# Patient Record
Sex: FEMALE | URBAN - METROPOLITAN AREA
[De-identification: names, ages, dates, MRNs, and addresses within clinical notes are randomized per-mention and may not be internally consistent; named-entity substitution may affect disease eponyms.]

---

## 2018-03-01 ENCOUNTER — OUTPATIENT (OUTPATIENT)
Dept: OUTPATIENT SERVICES | Facility: HOSPITAL | Age: 73
LOS: 1 days | End: 2018-03-01

## 2018-03-01 DIAGNOSIS — Z98.89 OTHER SPECIFIED POSTPROCEDURAL STATES: Chronic | ICD-10-CM

## 2018-03-23 DIAGNOSIS — R69 ILLNESS, UNSPECIFIED: ICD-10-CM

## 2018-10-01 ENCOUNTER — OUTPATIENT (OUTPATIENT)
Dept: OUTPATIENT SERVICES | Facility: HOSPITAL | Age: 73
LOS: 1 days | End: 2018-10-01
Payer: MEDICAID

## 2018-10-01 DIAGNOSIS — Z98.89 OTHER SPECIFIED POSTPROCEDURAL STATES: Chronic | ICD-10-CM

## 2018-10-01 PROCEDURE — G9001: CPT

## 2018-10-11 DIAGNOSIS — Z71.89 OTHER SPECIFIED COUNSELING: ICD-10-CM

## 2019-05-15 PROBLEM — Z00.00 ENCOUNTER FOR PREVENTIVE HEALTH EXAMINATION: Status: ACTIVE | Noted: 2019-05-15

## 2019-06-03 ENCOUNTER — APPOINTMENT (OUTPATIENT)
Dept: ELECTROPHYSIOLOGY | Facility: CLINIC | Age: 74
End: 2019-06-03
Payer: MEDICAID

## 2019-06-03 ENCOUNTER — NON-APPOINTMENT (OUTPATIENT)
Age: 74
End: 2019-06-03

## 2019-06-03 VITALS
SYSTOLIC BLOOD PRESSURE: 125 MMHG | RESPIRATION RATE: 15 BRPM | DIASTOLIC BLOOD PRESSURE: 84 MMHG | HEIGHT: 58 IN | TEMPERATURE: 97.7 F | WEIGHT: 136 LBS | HEART RATE: 66 BPM | OXYGEN SATURATION: 98 % | BODY MASS INDEX: 28.55 KG/M2

## 2019-06-03 DIAGNOSIS — Z86.39 PERSONAL HISTORY OF OTHER ENDOCRINE, NUTRITIONAL AND METABOLIC DISEASE: ICD-10-CM

## 2019-06-03 DIAGNOSIS — Z78.9 OTHER SPECIFIED HEALTH STATUS: ICD-10-CM

## 2019-06-03 DIAGNOSIS — I44.7 LEFT BUNDLE-BRANCH BLOCK, UNSPECIFIED: ICD-10-CM

## 2019-06-03 PROCEDURE — 99203 OFFICE O/P NEW LOW 30 MIN: CPT

## 2019-06-03 PROCEDURE — 93000 ELECTROCARDIOGRAM COMPLETE: CPT

## 2019-06-03 NOTE — HISTORY OF PRESENT ILLNESS
[FreeTextEntry1] : Darrin Chicas MD\par \par Sara Israel is a 74y/o woman with Hx of HTN, HLD, DMII, all of which are stable, hypothyroid, and recurring dizziness and known LBBB who presents today for initial evaluation. Was recently staying in [place] with her granddaughter and was having multiple episodes of dizziness. At the time, there was medication confusion and she may have been taking too much antihypertensives. Since that time, she has now been better managing her medications and has been without dizziness. Denies chest pain, palpitations, SOB, syncope or near syncope.

## 2019-06-03 NOTE — DISCUSSION/SUMMARY
[FreeTextEntry1] : Sara Israel is a 72y/o woman with Hx of HTN, HLD, DMII, all of which are stable, hypothyroid, and recurring dizziness and known LBBB who presents today for initial evaluation.\par \par Impression:\par \par 1. LBBB: EKG today NSR with LBBB. No history of syncope. Recurring dizziness likely secondary to hypotension given medication confusion at that time. Consider possible CardioNet to assess for presence of bradyarrhythmias associated with dizziness. \par \par 2. HTN: resume oral antihypertensives as prescribed. Encouraged heart healthy diet, sodium restriction, and weight loss. Continue regular f/u with Cardiologist for further HTN management.\par \par 3. HLD: resume statin therapy as prescribed and regular f/u with Cardiologist for routine lipid monitoring and management.\par \par Continue regular f/u with Cardiologist and RTO as needed or if any new symptoms or syncope occur.

## 2019-06-03 NOTE — PHYSICAL EXAM
[General Appearance - Well Developed] : well developed [Normal Appearance] : normal appearance [General Appearance - Well Nourished] : well nourished [Well Groomed] : well groomed [General Appearance - In No Acute Distress] : no acute distress [No Deformities] : no deformities [Eyelids - No Xanthelasma] : the eyelids demonstrated no xanthelasmas [Normal Conjunctiva] : the conjunctiva exhibited no abnormalities [No Oral Pallor] : no oral pallor [Normal Oral Mucosa] : normal oral mucosa [Normal Jugular Venous A Waves Present] : normal jugular venous A waves present [No Oral Cyanosis] : no oral cyanosis [Normal Jugular Venous V Waves Present] : normal jugular venous V waves present [No Jugular Venous Raines A Waves] : no jugular venous raines A waves [Heart Sounds] : normal S1 and S2 [Murmurs] : no murmurs present [Heart Rate And Rhythm] : heart rate and rhythm were normal [Exaggerated Use Of Accessory Muscles For Inspiration] : no accessory muscle use [Respiration, Rhythm And Depth] : normal respiratory rhythm and effort [Auscultation Breath Sounds / Voice Sounds] : lungs were clear to auscultation bilaterally [Abdomen Soft] : soft [Abdomen Tenderness] : non-tender [Abdomen Mass (___ Cm)] : no abdominal mass palpated [Abnormal Walk] : normal gait [Gait - Sufficient For Exercise Testing] : the gait was sufficient for exercise testing [Petechial Hemorrhages (___cm)] : no petechial hemorrhages [Nail Clubbing] : no clubbing of the fingernails [Cyanosis, Localized] : no localized cyanosis [Skin Color & Pigmentation] : normal skin color and pigmentation [No Venous Stasis] : no venous stasis [] : no rash [Skin Lesions] : no skin lesions [No Xanthoma] : no  xanthoma was observed [No Skin Ulcers] : no skin ulcer [Mood] : the mood was normal [Oriented To Time, Place, And Person] : oriented to person, place, and time [Affect] : the affect was normal [No Anxiety] : not feeling anxious

## 2019-06-12 ENCOUNTER — OUTPATIENT (OUTPATIENT)
Dept: OUTPATIENT SERVICES | Facility: HOSPITAL | Age: 74
LOS: 1 days | End: 2019-06-12
Payer: MEDICAID

## 2019-06-12 ENCOUNTER — APPOINTMENT (OUTPATIENT)
Dept: MRI IMAGING | Facility: IMAGING CENTER | Age: 74
End: 2019-06-12
Payer: MEDICAID

## 2019-06-12 DIAGNOSIS — R51 HEADACHE: ICD-10-CM

## 2019-06-12 DIAGNOSIS — Z98.89 OTHER SPECIFIED POSTPROCEDURAL STATES: Chronic | ICD-10-CM

## 2019-06-12 PROCEDURE — 70551 MRI BRAIN STEM W/O DYE: CPT

## 2019-06-12 PROCEDURE — 70551 MRI BRAIN STEM W/O DYE: CPT | Mod: 26

## 2019-09-03 ENCOUNTER — APPOINTMENT (OUTPATIENT)
Dept: NEUROLOGY | Facility: CLINIC | Age: 74
End: 2019-09-03

## 2021-11-10 ENCOUNTER — FORM ENCOUNTER (OUTPATIENT)
Age: 76
End: 2021-11-10

## 2021-11-18 ENCOUNTER — APPOINTMENT (OUTPATIENT)
Dept: HOME HEALTH SERVICES | Facility: HOME HEALTH | Age: 76
End: 2021-11-18
Payer: MEDICARE

## 2021-11-18 VITALS — SYSTOLIC BLOOD PRESSURE: 174 MMHG | HEART RATE: 74 BPM | DIASTOLIC BLOOD PRESSURE: 74 MMHG | OXYGEN SATURATION: 99 %

## 2021-11-18 DIAGNOSIS — K42.9 UMBILICAL HERNIA W/OUT OBSTRUCTION OR GANGRENE: ICD-10-CM

## 2021-11-18 DIAGNOSIS — R53.1 WEAKNESS: ICD-10-CM

## 2021-11-18 DIAGNOSIS — Z23 ENCOUNTER FOR IMMUNIZATION: ICD-10-CM

## 2021-11-18 PROCEDURE — 99345 HOME/RES VST NEW HIGH MDM 75: CPT | Mod: 25

## 2021-11-18 PROCEDURE — G0506: CPT

## 2021-11-18 PROCEDURE — 99497 ADVNCD CARE PLAN 30 MIN: CPT

## 2021-11-18 RX ORDER — LOSARTAN POTASSIUM 50 MG/1
50 TABLET, FILM COATED ORAL DAILY
Qty: 90 | Refills: 2 | Status: COMPLETED | COMMUNITY
Start: 2019-06-03 | End: 2021-11-18

## 2021-11-18 RX ORDER — METFORMIN ER 750 MG 750 MG/1
750 TABLET ORAL DAILY
Refills: 0 | Status: COMPLETED | COMMUNITY
Start: 2019-06-03 | End: 2021-11-18

## 2021-11-20 PROBLEM — R53.1 GENERALIZED WEAKNESS: Status: ACTIVE | Noted: 2021-11-20

## 2021-11-20 PROBLEM — K42.9 UMBILICAL HERNIA WITHOUT OBSTRUCTION AND WITHOUT GANGRENE: Status: ACTIVE | Noted: 2021-11-18

## 2021-11-20 NOTE — COUNSELING
[Overweight - ( BMI 25.1 - 29.9 )] : overweight -  ( BMI 25.1 - 29.9 ) [Continue diet as tolerated] : continue diet as tolerated based on goals of care [Non - Smoker] : non-smoker [Remove clutter and unsafe carpeting to avoid falls] : remove clutter and unsafe carpeting to avoid falls [] : foot exam [Completed] : Aspirin use discussion completed [Improve mobility] : improve mobility [Decrease hospital use] : decrease hospital use [Minimize unnecessary interventions] : minimize unnecessary interventions [Maintain functional ability] : maintain functional ability [Patient/Caregiver has ___ understanding of disease process] : patient/caregiver has [unfilled] understanding of disease process [Completed Medical Orders for Life-Sustaining Treatment] : completed medical orders for life-sustaining treatment [Full Code] : Code Status: Full Code [No Limitations] : Treatment Guidelines: No limitations [[facility]: [facility] [[facility]filled] [Trial of Intubation] : Intubation: Trial of Intubation [_____] : HCP: [unfilled] [ - New patient with 2 or more chronic conditions; CCM discussed and patient-centered care plan established] : New patient with 2 or more chronic conditions; CCM discussed and patient-centered care plan established

## 2021-11-24 ENCOUNTER — LABORATORY RESULT (OUTPATIENT)
Age: 76
End: 2021-11-24

## 2021-11-26 ENCOUNTER — NON-APPOINTMENT (OUTPATIENT)
Age: 76
End: 2021-11-26

## 2021-11-29 ENCOUNTER — NON-APPOINTMENT (OUTPATIENT)
Age: 76
End: 2021-11-29

## 2021-12-14 NOTE — COUNSELING
[Overweight - ( BMI 25.1 - 29.9 )] : overweight -  ( BMI 25.1 - 29.9 ) [Continue diet as tolerated] : continue diet as tolerated based on goals of care [Non - Smoker] : non-smoker [Remove clutter and unsafe carpeting to avoid falls] : remove clutter and unsafe carpeting to avoid falls [] : foot exam [Completed] : Aspirin use discussion completed [Improve mobility] : improve mobility [Decrease hospital use] : decrease hospital use [Minimize unnecessary interventions] : minimize unnecessary interventions [Maintain functional ability] : maintain functional ability [Patient/Caregiver has ___ understanding of disease process] : patient/caregiver has [unfilled] understanding of disease process [Completed Medical Orders for Life-Sustaining Treatment] : completed medical orders for life-sustaining treatment [Full Code] : Code Status: Full Code [No Limitations] : Treatment Guidelines: No limitations [Trial of Intubation] : Intubation: Trial of Intubation [[facility]: _____[facility]last verification date: [unfilled] [_____] : HCP: [unfilled]

## 2021-12-15 ENCOUNTER — APPOINTMENT (OUTPATIENT)
Dept: HOME HEALTH SERVICES | Facility: HOME HEALTH | Age: 76
End: 2021-12-15
Payer: MEDICARE

## 2021-12-15 VITALS — DIASTOLIC BLOOD PRESSURE: 64 MMHG | SYSTOLIC BLOOD PRESSURE: 130 MMHG | HEART RATE: 74 BPM | OXYGEN SATURATION: 100 %

## 2021-12-15 DIAGNOSIS — M17.11 UNILATERAL PRIMARY OSTEOARTHRITIS, RIGHT KNEE: ICD-10-CM

## 2021-12-15 PROCEDURE — 99349 HOME/RES VST EST MOD MDM 40: CPT

## 2021-12-15 RX ORDER — PRAVASTATIN SODIUM 10 MG/1
10 TABLET ORAL
Qty: 90 | Refills: 0 | Status: COMPLETED | COMMUNITY
Start: 2019-06-03 | End: 2021-12-15

## 2021-12-15 RX ORDER — LEVOTHYROXINE SODIUM 0.03 MG/1
25 TABLET ORAL DAILY
Qty: 30 | Refills: 0 | Status: COMPLETED | COMMUNITY
Start: 2019-06-03 | End: 2021-12-15

## 2022-01-07 PROBLEM — M17.11 PRIMARY OSTEOARTHRITIS OF RIGHT KNEE: Status: ACTIVE | Noted: 2021-11-18

## 2022-01-19 ENCOUNTER — LABORATORY RESULT (OUTPATIENT)
Age: 77
End: 2022-01-19

## 2022-01-19 ENCOUNTER — NON-APPOINTMENT (OUTPATIENT)
Age: 77
End: 2022-01-19

## 2022-01-19 LAB
ALBUMIN SERPL ELPH-MCNC: 4.7 G/DL
ALP BLD-CCNC: 70 U/L
ALT SERPL-CCNC: 11 U/L
ANION GAP SERPL CALC-SCNC: 12 MMOL/L
AST SERPL-CCNC: 18 U/L
BASOPHILS # BLD AUTO: 0.1 K/UL
BASOPHILS NFR BLD AUTO: 1.3 %
BILIRUB SERPL-MCNC: 0.6 MG/DL
BUN SERPL-MCNC: 15 MG/DL
CALCIUM SERPL-MCNC: 9.9 MG/DL
CHLORIDE SERPL-SCNC: 104 MMOL/L
CO2 SERPL-SCNC: 25 MMOL/L
CREAT SERPL-MCNC: 0.83 MG/DL
EOSINOPHIL # BLD AUTO: 0.07 K/UL
EOSINOPHIL NFR BLD AUTO: 0.9 %
GLUCOSE SERPL-MCNC: 122 MG/DL
HCT VFR BLD CALC: 35 %
HGB BLD-MCNC: 11.2 G/DL
IMM GRANULOCYTES NFR BLD AUTO: 0.6 %
LYMPHOCYTES # BLD AUTO: 3.56 K/UL
LYMPHOCYTES NFR BLD AUTO: 46 %
MAN DIFF?: NORMAL
MCHC RBC-ENTMCNC: 30 PG
MCHC RBC-ENTMCNC: 32 GM/DL
MCV RBC AUTO: 93.8 FL
MONOCYTES # BLD AUTO: 0.54 K/UL
MONOCYTES NFR BLD AUTO: 7 %
NEUTROPHILS # BLD AUTO: 3.42 K/UL
NEUTROPHILS NFR BLD AUTO: 44.2 %
PLATELET # BLD AUTO: 283 K/UL
POTASSIUM SERPL-SCNC: 5 MMOL/L
PROT SERPL-MCNC: 6.8 G/DL
RBC # BLD: 3.73 M/UL
RBC # FLD: 13.1 %
SODIUM SERPL-SCNC: 141 MMOL/L
WBC # FLD AUTO: 7.74 K/UL

## 2022-01-24 LAB
24R-OH-CALCIDIOL SERPL-MCNC: 69.4 PG/ML
25(OH)D3 SERPL-MCNC: 56 NG/ML
ESTIMATED AVERAGE GLUCOSE: 148 MG/DL
HBA1C MFR BLD HPLC: 6.8 %
SARS-COV-2 N GENE NPH QL NAA+PROBE: NOT DETECTED

## 2022-03-14 ENCOUNTER — APPOINTMENT (OUTPATIENT)
Dept: HOME HEALTH SERVICES | Facility: HOME HEALTH | Age: 77
End: 2022-03-14
Payer: MEDICARE

## 2022-03-14 VITALS — BODY MASS INDEX: 26.79 KG/M2 | WEIGHT: 127.6 LBS | HEIGHT: 58 IN

## 2022-03-14 VITALS — OXYGEN SATURATION: 99 % | SYSTOLIC BLOOD PRESSURE: 122 MMHG | DIASTOLIC BLOOD PRESSURE: 66 MMHG | HEART RATE: 72 BPM

## 2022-03-14 DIAGNOSIS — E78.00 PURE HYPERCHOLESTEROLEMIA, UNSPECIFIED: ICD-10-CM

## 2022-03-14 PROCEDURE — 99349 HOME/RES VST EST MOD MDM 40: CPT

## 2022-03-14 RX ORDER — TERBINAFINE HYDROCHLORIDE 1 G/100G
1 CREAM TOPICAL 3 TIMES DAILY
Qty: 1 | Refills: 0 | Status: COMPLETED | COMMUNITY
Start: 2022-01-21 | End: 2022-03-14

## 2022-03-14 RX ORDER — GUAIFENESIN AND DEXTROMETHORPHAN 10; 100 MG/5ML; MG/5ML
10-100 SYRUP ORAL
Qty: 1 | Refills: 2 | Status: COMPLETED | COMMUNITY
Start: 2022-01-13 | End: 2022-03-14

## 2022-03-14 NOTE — COUNSELING
[Overweight - ( BMI 25.1 - 29.9 )] : overweight -  ( BMI 25.1 - 29.9 ) [Continue diet as tolerated] : continue diet as tolerated based on goals of care [Non - Smoker] : non-smoker [Remove clutter and unsafe carpeting to avoid falls] : remove clutter and unsafe carpeting to avoid falls [] : foot exam [Completed] : Aspirin use discussion completed [Improve mobility] : improve mobility [Decrease hospital use] : decrease hospital use [Minimize unnecessary interventions] : minimize unnecessary interventions [Maintain functional ability] : maintain functional ability [Patient/Caregiver has ___ understanding of disease process] : patient/caregiver has [unfilled] understanding of disease process [Completed Medical Orders for Life-Sustaining Treatment] : completed medical orders for life-sustaining treatment [Full Code] : Code Status: Full Code [Trial of Intubation] : Intubation: Trial of Intubation [No Limitations] : Treatment Guidelines: No limitations [[facility]: _____[facility]/last verification date: [unfilled] [_____] : HCP: [unfilled]

## 2022-05-23 ENCOUNTER — NON-APPOINTMENT (OUTPATIENT)
Age: 77
End: 2022-05-23

## 2022-05-31 ENCOUNTER — LABORATORY RESULT (OUTPATIENT)
Age: 77
End: 2022-05-31

## 2022-05-31 LAB
ALBUMIN SERPL ELPH-MCNC: 4.8 G/DL
ALP BLD-CCNC: 64 U/L
ALT SERPL-CCNC: 18 U/L
ANION GAP SERPL CALC-SCNC: 17 MMOL/L
AST SERPL-CCNC: 25 U/L
BASOPHILS # BLD AUTO: 0.07 K/UL
BASOPHILS NFR BLD AUTO: 1.1 %
BILIRUB SERPL-MCNC: 0.5 MG/DL
BUN SERPL-MCNC: 24 MG/DL
CALCIUM SERPL-MCNC: 9.8 MG/DL
CHLORIDE SERPL-SCNC: 102 MMOL/L
CHOLEST SERPL-MCNC: 179 MG/DL
CO2 SERPL-SCNC: 18 MMOL/L
CREAT SERPL-MCNC: 0.9 MG/DL
EGFR: 67 ML/MIN/1.73M2
EOSINOPHIL # BLD AUTO: 0.09 K/UL
EOSINOPHIL NFR BLD AUTO: 1.5 %
ESTIMATED AVERAGE GLUCOSE: 146 MG/DL
GLUCOSE SERPL-MCNC: 111 MG/DL
HBA1C MFR BLD HPLC: 6.7 %
HCT VFR BLD CALC: 35.6 %
HDLC SERPL-MCNC: 57 MG/DL
HGB BLD-MCNC: 11.2 G/DL
IMM GRANULOCYTES NFR BLD AUTO: 0.3 %
LDLC SERPL CALC-MCNC: 97 MG/DL
LYMPHOCYTES # BLD AUTO: 2.84 K/UL
LYMPHOCYTES NFR BLD AUTO: 46.6 %
MAN DIFF?: NORMAL
MCHC RBC-ENTMCNC: 30.1 PG
MCHC RBC-ENTMCNC: 31.5 GM/DL
MCV RBC AUTO: 95.7 FL
MONOCYTES # BLD AUTO: 0.42 K/UL
MONOCYTES NFR BLD AUTO: 6.9 %
NEUTROPHILS # BLD AUTO: 2.66 K/UL
NEUTROPHILS NFR BLD AUTO: 43.6 %
NONHDLC SERPL-MCNC: 122 MG/DL
PLATELET # BLD AUTO: 207 K/UL
POTASSIUM SERPL-SCNC: 4.7 MMOL/L
PROT SERPL-MCNC: 6.9 G/DL
RBC # BLD: 3.72 M/UL
RBC # FLD: 12.7 %
SODIUM SERPL-SCNC: 137 MMOL/L
TRIGL SERPL-MCNC: 122 MG/DL
TSH SERPL-ACNC: 0.17 UIU/ML
WBC # FLD AUTO: 6.1 K/UL

## 2022-05-31 RX ORDER — LEVOTHYROXINE SODIUM 0.05 MG/1
50 TABLET ORAL DAILY
Qty: 90 | Refills: 3 | Status: COMPLETED | COMMUNITY
Start: 2021-11-26 | End: 2022-05-31

## 2022-06-09 NOTE — COUNSELING
[Overweight - ( BMI 25.1 - 29.9 )] : overweight -  ( BMI 25.1 - 29.9 ) [Continue diet as tolerated] : continue diet as tolerated based on goals of care [Non - Smoker] : non-smoker [Remove clutter and unsafe carpeting to avoid falls] : remove clutter and unsafe carpeting to avoid falls [] : foot exam [Completed] : Aspirin use discussion completed [Improve mobility] : improve mobility [Decrease hospital use] : decrease hospital use [Minimize unnecessary interventions] : minimize unnecessary interventions [Maintain functional ability] : maintain functional ability [Patient/Caregiver has ___ understanding of disease process] : patient/caregiver has [unfilled] understanding of disease process [Completed Medical Orders for Life-Sustaining Treatment] : completed medical orders for life-sustaining treatment [Full Code] : Code Status: Full Code [No Limitations] : Treatment Guidelines: No limitations [Trial of Intubation] : Intubation: Trial of Intubation [[facility]: _____] [facility]/last verification date: [unfilled] [_____] : HCP: [unfilled]

## 2022-06-10 ENCOUNTER — APPOINTMENT (OUTPATIENT)
Dept: HOME HEALTH SERVICES | Facility: HOME HEALTH | Age: 77
End: 2022-06-10
Payer: MEDICARE

## 2022-06-10 VITALS — OXYGEN SATURATION: 98 % | DIASTOLIC BLOOD PRESSURE: 64 MMHG | HEART RATE: 64 BPM | SYSTOLIC BLOOD PRESSURE: 124 MMHG

## 2022-06-10 VITALS — WEIGHT: 129.6 LBS | BODY MASS INDEX: 27.09 KG/M2

## 2022-06-10 PROCEDURE — 99349 HOME/RES VST EST MOD MDM 40: CPT

## 2022-07-20 ENCOUNTER — APPOINTMENT (OUTPATIENT)
Dept: HOME HEALTH SERVICES | Facility: HOME HEALTH | Age: 77
End: 2022-07-20

## 2022-08-15 ENCOUNTER — NON-APPOINTMENT (OUTPATIENT)
Age: 77
End: 2022-08-15

## 2022-08-15 ENCOUNTER — TRANSCRIPTION ENCOUNTER (OUTPATIENT)
Age: 77
End: 2022-08-15

## 2022-08-17 NOTE — HISTORY OF PRESENT ILLNESS
[Patient] : patient [Family Member] : family member [FreeTextEntry1] : N/A, [facility] [FreeTextEntry2] : COVID SCREEN:\par Patient or caretaker denies fever, cough, trouble breathing, rash, vomiting. Patient has not been in close contact with anyone who is COVID-19 positive, or suspected of having COVID-19.\par \par N95 mask, gloves, eye wear and gown (if indicated) used during visit: Yes.\par \par Total face to face time with patient is 90 min.\par \par HPI:\par 74yo F with PMH early stage dementia, MDD, HTN, HLD, T2DM, hypothyroidism, LBBB, h/o dizziness. Seen today for admission to [facility] program.\par \par Social/Home Environment: Speaks primarily Malayalam, limited English. Strongly Christianity, Mormonism. Father was a  from 2000-year-old tradition based in [place]. As such, she is adamant against certain forms of medical intervention & seeking care. \par -Daughter Siomara is , with Master's in genetics, former schoolteacher. Now a preacher, also believes in blayne healing.\par -Family from [facility]. Has three other daughters, 2 in [place], 1 in [place] (missionary)\par -[employer] insurance\par -neurologist Dr. Luc Judge\par -Dr. Alicia Worley, PCP\par \par Interval Events:\par -Interviewed with daughter, who primarily speaks for pt. Daughter speaks almost continuously and at times is circumstantial.\par -Overall, daughter describes pt's mental health as in decline for past 2 years. Increasing memory deficits & days where she hallucinates. Has seen neurologist, had MR brain done 2019, recently CT brain which again showed no specific findings. MR brain planned.\par -Migraine-like symptoms 2 months ago when she had headaches, paresthesias of right scalp. Daughter was concerned about a stroke, but pt adamantly refused to seek medical attention.\par -Needs assistance with most ADLs at this point. Refuses to shower, stating she's already bathed.\par -Siomara's daughter previously was named HCP, but they lost the paperwork. Patient able to demonstrate understanding of surrogate decision maker and names her daughter Siomara to do so. New form filled today.\par -Endorses weakness, which is ill defined. Will order a set of labs & TTE at home.\par -Metformin 500mg BID -> 750mg ER, but caused low sugar episodes. Held by PCP who noted A1C 6.0% 7/2021\par -Severe knee & back pain. Consider R knee steroid injection in 4 weeks during f/u visit.\par -Refuses COVID vaccine. Per daughter, pt has severe reactions to any vaccines.\par -"Allergic" to flu vaccine, which makes her very sick\par -Left numbers for home visit podiatrist\par -Refuses colon cancer screening due to not tolerating prep. had stool testing 2 years ago last.\par -Refuses mammography after prior discussoin with PCP. Unable to state her reasons. Daughter is unable to/uninterested in getting her to mammography suite\par -Discussed SSRI given daughter's description of her being very depressed recently. Howevet it's unclear she'd be able to introduce a new pill into her routine, as having her take pravastatin required "a long time and a lot of effort."\par -Stopped ASA 81mg after numerous falls including down stairs.\par -Had bad reaction to nuclear agent during stress test previously. Advise she should discuss options with cardiologist, Siomara responds "they never have time to discuss these things." Advise she call this doctor's office and if needed leave a message explaining her mom had a reaction to nuclear stress test and she's looking for another option.\par \par Subjective:\par 1. Appetite/Weight: Appetite fair.\par 2. Gait/Falls: Unsteady, walks grabbing furniture\par 3. Sleep: Naps during day, early in morning.\par 4. BMs: No concerns, able to use toilet but has to be queued.\par 5. Urine: No concerns, able to use toilet but has to be queued.\par 6. Skin: No rash or ulcers.\par 7. Mood/Memory: Memory impaired, mood good.\par \par DME: none\par \par ADVANCE CARE PLANNING:\par 1. Total Time Spent: 20 min\par 2. Participants: Myself, daughter, SW\par 3. Discussion: Goals of Care, Advanced Directives, Health Care Agency, and Disease trajectory\par 4. Disease Trajectory: Discussed and reviewed that patient's health is currently stable, and the prognosis moving forward is unclear.\par 5. Prognosis, Based On Clinician Best Judgment: Indeterminate\par 6. Goals of Care: 1) Extend life, by hospitalization and aggressive measures if needed, 2) Avoid discomfort, 3) Manage medical problems at home where safely possible.\par 7. HCA: Daughter Siomara\par 8. MOLST Completed: CPR yes, intubation and trial ventilation, do hospitalize, no limitation on medical interventions, long-term feeding tube, trial IVF, use antibiotics.\par 9. Hospice Benefit discussion deferred at this time.

## 2022-08-17 NOTE — HISTORY OF PRESENT ILLNESS
[Patient] : patient [Family Member] : family member [FreeTextEntry1] : N/A, [facility] [FreeTextEntry2] : COVID SCREEN:\par Patient or caretaker denies fever, cough, trouble breathing, rash, vomiting. Patient has not been in close contact with anyone who is COVID-19 positive, or suspected of having COVID-19.\par \par N95 mask, gloves, eye wear and gown (if indicated) used during visit: Yes.\par \par Total face to face time with patient is 45 min.\par \par HPI:\par 74yo F with PMH early stage dementia, MDD, HTN, HLD, T2DM, hypothyroidism, LBBB, h/o dizziness. Seen today for 4-week followup visit post admission to [facility].\par \par Social/Home Environment: Speaks primarily Malayalam, limited English. Strongly Scientologist, Muslim. Father was a  from 2000-year-old tradition based in [place]. As such, she is adamant against certain forms of medical intervention & seeking care. \par -Daughter Siomara is , with Master's in genetics, former schoolteacher. Now a preacher, also believes in blayne healing.\par -Family from [facility]. Has three other daughters, 2 in [place], 1 in [place] (missionary)\par -[employer] insurance\par -neurologist Dr. Luc Judge\par -Dr. Alicia Worley, PCP\par \par Interval Events:\par -Interviewed with daughter, who primarily speaks for pt. Daughter speaks almost continuously and at times is circumstantial.\par -Daughter requests longer notice before X-rays\par -Increased levothyroxine to 50mcg daily based on high TSH\par -Labs otherwise unremarkable.\par -Dtr feels her mood/energy have improved since dose change\par -Had TTE with cardiologist showing pericardial effusion, but then normal on re-read\par -Dtr attributes her 's MI to vaccination.\par -Dtr worried about worse AN. However this is chronic and she's had fairly recent TTE\par -Wants lab results (namely cholesterol) sent to cardiologist -- will get repeat TSH and lipid panel in 6 weeks.\par -Vaccine allergy per daughter, so declining any vaccination.\par \par Subjective:\par 1. Appetite/Weight: Appetite fair.\par 2. Gait/Falls: Unsteady, walks grabbing furniture\par 3. Sleep: Naps during day, early in morning.\par 4. BMs: No concerns, able to use toilet but has to be queued.\par 5. Urine: No concerns, able to use toilet but has to be queued.\par 6. Skin: No rash or ulcers.\par 7. Mood/Memory: Memory impaired, mood good.\par \par DME: none

## 2022-08-17 NOTE — HISTORY OF PRESENT ILLNESS
[Patient] : patient [Family Member] : family member [FreeTextEntry1] : N[facility]A, [facility] [FreeTextEntry2] : COVID SCREEN:\par Patient or caretaker denies fever, cough, trouble breathing, rash, vomiting. Patient has not been in close contact with anyone who is COVID-19 positive, or suspected of having COVID-19.\par \par N95 mask, gloves, eye wear and gown (if indicated) used during visit: Yes.\par \par Total face to face time with patient is 45 min.\par \par HPI:\par 75yo F with PMH early stage dementia, MDD, HTN, HLD, T2DM, hypothyroidism, LBBB, h/o dizziness/vertigo. Seen today for routine f/u visit.\par \par Social/Home Environment: Speaks primarily Malayalam, limited English. Strongly Sabianist, Judaism. Father was a  from 2000-year-old tradition based in [place]. As such, she is adamant against certain forms of medical intervention & seeking care. \par -Daughter Siomara is , with master's in genetics, former schoolteacher. Now a preacher, also believes in blayne healing.\par -Family from [facility]. Pt has three other daughters, 2 in [facility], 1 in [place], [place] (missionary)\par -[employer] insurance\par -Neurologist Dr. Luc Judge\par -PCP Dr. Alicia Worley\par \par Interval Events:\par -Interviewed with daughter, who primarily speaks for pt.\par -New severe pain in abdomen, but probably same as cramps previously described\par -Dtr reports ongoing difficulty with getting pt to bathe -- no shower in 4 weeks, and states pt "lies" about how often she showers. Writer does not note pt to be malodorous or very disheveled.\par -Found new podiatrist through [employer]\par -Dtr reports improved memory since starting Align probiotic\par -Pt wants to travel to [place], but dtr frustrated because she cannot tolerate leaving house or wearing mask in public, but she constantly asks to travel without being able to understand the reasons why she can't.\par -Suspect abdominal pain are stomach cramps which have been previously discussed. Pt has known h/o hernia repair with mesh, then removal of half of mesh due to abscess. Dtr suspects her stomach cramps are related to this retained mesh, but pt refused ultrasound at home to assess for presence of intraabdominal pathology. For now, seems intermittent & stable for more than a year at least.\par -Weight has decreased but only comparison is weight from 6/2019 of 136 lbs (127.6 today). Dtr states that on the whole, her appetite is good.\par -Asks about cholesterol, review results which show , apparently decrease from 310mg/dl with previous PCP.\par -Skin lesion of central chest, dtr attributes this to Holter monitor she wore 11/2021. Review that pt ideally should see dermatologist about chest lesion, but pt has been notoriously difficult to bring to doctors. Dtr states lesion is decreasing in size, so we will continue monitoring for now.\par \par Subjective:\par 1. Appetite/Weight: Appetite fair.\par 2. Gait/Falls: Unsteady, walks grabbing furniture\par 3. Sleep: Naps during day, early in morning.\par 4. BMs: No concerns, able to use toilet but has to be queued.\par 5. Urine: No concerns, able to use toilet but has to be queued.\par 6. Skin: No rash or ulcers.\par 7. Mood/Memory: Memory impaired, mood good.\par \par DME: none

## 2022-08-17 NOTE — PHYSICAL EXAM
[No Acute Distress] : no acute distress [Well Nourished] : well nourished [Well Developed] : well developed [Normal Sclera/Conjunctiva] : normal sclera/conjunctiva [EOMI] : extra ocular movement intact [Normal Outer Ear/Nose] : the ears and nose were normal in appearance [Normal Oropharynx] : the oropharynx was normal [No Respiratory Distress] : no respiratory distress [No Accessory Muscle Use] : no accessory muscle use [No Edema] : there was no peripheral edema [Non Tender] : non-tender [Soft] : abdomen soft [Not Distended] : not distended [Normal Post Cervical Nodes] : no posterior cervical lymphadenopathy [Normal Anterior Cervical Nodes] : no anterior cervical lymphadenopathy [Normal Gait] : normal gait [Normal Strength/Tone] : muscle strength and tone were normal [No Rash] : no rash [Normal Affect] : the affect was normal [Normal Mood] : the mood was normal [Normal Voice/Communication] : normal voice communication [No JVD] : no jugular venous distention [Supple] : the neck was supple [No LAD] : no lymphadenopathy [No LE Varicosities] : there were no varicosital changes in the lower extremities [Pedal Pulses Present] : the pedal pulses are present [No Joint Swelling] : no joint swelling seen [No Clubbing, Cyanosis] : no clubbing  or cyanosis of the fingernails [Cranial Nerves Intact] : cranial nerves 2-12 were intact [No Motor Deficits] : the motor exam was normal [No Gross Sensory Deficits] : no gross sensory deficits [Oriented x3] : oriented to person, place, and time [de-identified] : Seated in chair, comfortable, good eye contact. [de-identified] : R eye ptosis [de-identified] : 1.5x1.5cm dark brown/purple papule overlying 5th rib just R of sternum. [de-identified] : Reactive affect. Oriented to place, person, daughter, not date. Thought content hyperreligious. [de-identified] : Long toenails with onychomycosis of all nails.

## 2022-08-17 NOTE — PHYSICAL EXAM
[No Acute Distress] : no acute distress [Well Nourished] : well nourished [Well Developed] : well developed [Normal Sclera/Conjunctiva] : normal sclera/conjunctiva [EOMI] : extra ocular movement intact [Normal Outer Ear/Nose] : the ears and nose were normal in appearance [Normal Oropharynx] : the oropharynx was normal [No Respiratory Distress] : no respiratory distress [Clear to Auscultation] : lungs were clear to auscultation bilaterally [No Accessory Muscle Use] : no accessory muscle use [Normal Rate] : heart rate was normal  [Regular Rhythm] : with a regular rhythm [Normal S1, S2] : normal S1 and S2 [No Murmurs] : no murmurs heard [No Edema] : there was no peripheral edema [Normal Bowel Sounds] : normal bowel sounds [Non Tender] : non-tender [Soft] : abdomen soft [Not Distended] : not distended [Normal Post Cervical Nodes] : no posterior cervical lymphadenopathy [Normal Anterior Cervical Nodes] : no anterior cervical lymphadenopathy [No CVA Tenderness] : no ~M costovertebral angle tenderness [No Spinal Tenderness] : no spinal tenderness [Normal Gait] : normal gait [Normal Strength/Tone] : muscle strength and tone were normal [No Rash] : no rash [Oriented x3] : oriented to person, place, and time [Normal Affect] : the affect was normal [Normal Mood] : the mood was normal [Normal Insight/Judgement] : insight and judgment were intact [de-identified] : R eye ptosis [de-identified] : Reactive affect. Oriented to place, person, daughter, not date. Hyperreligious. [de-identified] : Long toenails with onychomycosis of all nails.

## 2022-08-17 NOTE — REASON FOR VISIT
[Pre-Visit Preparation] : pre-visit preparation was done [Follow-Up] : a follow-up visit [FreeTextEntry2] : chart review

## 2022-08-17 NOTE — REASON FOR VISIT
[Acute] : an acute visit [Pre-Visit Preparation] : pre-visit preparation was done [FreeTextEntry2] : chart review

## 2022-08-17 NOTE — HEALTH RISK ASSESSMENT
[HRA Reviewed] : Health risk assessment reviewed [No falls in past year] : Patient reported no falls in the past year [No] : The patient does not have visual impairment [Some assistance needed] : feeding [Full assistance needed] : managing finances [TimeGetUpGo] : 8

## 2022-08-17 NOTE — HISTORY OF PRESENT ILLNESS
[Patient] : patient [Family Member] : family member [FreeTextEntry1] : N/A, [facility] [FreeTextEntry2] : COVID SCREEN:\par Patient or caretaker denies fever, cough, trouble breathing, rash, vomiting. Patient has not been in close contact with anyone who is COVID-19 positive, or suspected of having COVID-19.\par \par N95 mask, gloves, eye wear and gown (if indicated) used during visit: Yes.\par \par Total face to face time with patient is 45 min.\par \par HPI:\par 75yo F with PMH early stage dementia, MDD, HTN, HLD, T2DM, hypothyroidism, LBBB, h/o dizziness/vertigo. Seen today for routine f/u visit.\par \par Social/Home Environment: Speaks primarily Malayalam, limited English. Strongly Zoroastrian, Pentecostalism. Father was a  from 2000-year-old tradition based in [place]. As such, she is adamant against certain forms of medical intervention & seeking care. \par -Daughter Siomara is , with master's in genetics, former schoolteacher. Now a preacher, also believes in blayne healing.\par -Family from [facility]. Pt has three other daughters, 2 in [facility], 1 in [place], [place] (missionary)\par -[employer] insurance\par -Neurologist Dr. Luc Judge\par -PCP Dr. Alicia Worley\par \par Interval Events:\par -Interviewed with daughter, who primarily speaks for pt.\par -Confusion, daughter reports she is increasingly argumentative, and confuses facts/recent memories and they get in arguments about this\par -[place] edwards suffered breakin by chance recently.\par -CDPAS form filled & faxed today. Dtr asks for writer to add "seal" to paper copy, but this has never been required in past.\par -Dtr complains at length about difficult getting lab to come for fasting draw.\par -Wants to review cardiology reports, advise she download from portal & email to writer. Holter monitor.\par -Burning of stomach wall associated with large ventral hernia. Waist circumference 35.5in, ordering hernia belt due to discomfort associated with ventral hernia\par \par Subjective:\par 1. Appetite/Weight: Appetite fair.\par 2. Gait/Falls: Unsteady, walks grabbing furniture\par 3. Sleep: Naps during day, early in morning.\par 4. BMs: No concerns, able to use toilet but has to be queued.\par 5. Urine: No concerns, able to use toilet but has to be queued.\par 6. Skin: No rash or ulcers.\par 7. Mood/Memory: Memory impaired, mood good.\par \par DME: none

## 2022-08-17 NOTE — REASON FOR VISIT
[Pre-Visit Preparation] : pre-visit preparation was done [Acute] : an acute visit [FreeTextEntry2] : chart review

## 2022-08-17 NOTE — PHYSICAL EXAM
[No Acute Distress] : no acute distress [Well Nourished] : well nourished [Well Developed] : well developed [Normal Voice/Communication] : normal voice communication [Normal Sclera/Conjunctiva] : normal sclera/conjunctiva [EOMI] : extra ocular movement intact [Normal Outer Ear/Nose] : the ears and nose were normal in appearance [Normal Oropharynx] : the oropharynx was normal [No JVD] : no jugular venous distention [Supple] : the neck was supple [No LAD] : no lymphadenopathy [No Respiratory Distress] : no respiratory distress [No Accessory Muscle Use] : no accessory muscle use [No LE Varicosities] : there were no varicosital changes in the lower extremities [Pedal Pulses Present] : the pedal pulses are present [No Edema] : there was no peripheral edema [Non Tender] : non-tender [Soft] : abdomen soft [Not Distended] : not distended [Normal Post Cervical Nodes] : no posterior cervical lymphadenopathy [Normal Anterior Cervical Nodes] : no anterior cervical lymphadenopathy [Normal Gait] : normal gait [No Joint Swelling] : no joint swelling seen [No Clubbing, Cyanosis] : no clubbing  or cyanosis of the fingernails [Normal Strength/Tone] : muscle strength and tone were normal [No Rash] : no rash [Cranial Nerves Intact] : cranial nerves 2-12 were intact [No Motor Deficits] : the motor exam was normal [No Gross Sensory Deficits] : no gross sensory deficits [Oriented x3] : oriented to person, place, and time [Normal Affect] : the affect was normal [Normal Mood] : the mood was normal [de-identified] : Seated in chair, comfortable, good eye contact. [de-identified] : R eye ptosis [de-identified] : Large ventral hernia R of midline adjacent to umilicus. [de-identified] : 1.5x1.5cm dark brown/purple papule overlying 5th rib just R of sternum. [de-identified] : Reactive affect. Oriented to place, person, daughter, not date. Thought content hyperreligious. [de-identified] : Long toenails with onychomycosis of all nails.

## 2022-08-17 NOTE — PHYSICAL EXAM
[No Acute Distress] : no acute distress [Well Nourished] : well nourished [Well Developed] : well developed [Normal Sclera/Conjunctiva] : normal sclera/conjunctiva [EOMI] : extra ocular movement intact [Normal Outer Ear/Nose] : the ears and nose were normal in appearance [Normal Oropharynx] : the oropharynx was normal [No Respiratory Distress] : no respiratory distress [Clear to Auscultation] : lungs were clear to auscultation bilaterally [No Accessory Muscle Use] : no accessory muscle use [Normal Rate] : heart rate was normal  [Regular Rhythm] : with a regular rhythm [Normal S1, S2] : normal S1 and S2 [No Murmurs] : no murmurs heard [No Edema] : there was no peripheral edema [Normal Bowel Sounds] : normal bowel sounds [Non Tender] : non-tender [Soft] : abdomen soft [Not Distended] : not distended [Normal Post Cervical Nodes] : no posterior cervical lymphadenopathy [Normal Anterior Cervical Nodes] : no anterior cervical lymphadenopathy [No CVA Tenderness] : no ~M costovertebral angle tenderness [No Spinal Tenderness] : no spinal tenderness [Normal Gait] : normal gait [Normal Strength/Tone] : muscle strength and tone were normal [No Rash] : no rash [Oriented x3] : oriented to person, place, and time [Normal Affect] : the affect was normal [Normal Mood] : the mood was normal [Normal Insight/Judgement] : insight and judgment were intact [de-identified] : R eye ptosis [de-identified] : Reactive affect. Oriented to place, person, daughter, not date. Hyperreligious. [de-identified] : Long toenails with onychomycosis of all nails.

## 2022-08-19 ENCOUNTER — APPOINTMENT (OUTPATIENT)
Dept: WOUND CARE | Facility: CLINIC | Age: 77
End: 2022-08-19

## 2022-08-19 VITALS — BODY MASS INDEX: 28.34 KG/M2 | WEIGHT: 135 LBS | TEMPERATURE: 97.6 F | HEIGHT: 58 IN

## 2022-08-19 DIAGNOSIS — S91.302A UNSPECIFIED OPEN WOUND, LEFT FOOT, INITIAL ENCOUNTER: ICD-10-CM

## 2022-08-19 PROCEDURE — 99203 OFFICE O/P NEW LOW 30 MIN: CPT

## 2022-08-19 NOTE — HISTORY OF PRESENT ILLNESS
[FreeTextEntry1] : 76 year old female presents with daughter for evaluation of painful thick toenail left hallux\par patient has dementia and doesn't listen to her daughter according to her daughter\par +DM neuropathy but unable to assess since patient does not cooperate due to dementia\par thick painful elongated dystrophic discolored nails 1,2,3,4,5 both feet\par partially attached left hallux nail\par Daughter relates that patient hit her nail and it started to lift off\par Very elongated neglected toenails both feet\par nails haven't been cut for over 2 yeatrs\par DP and PT palpable 2/4 both feet\par Daughter has no idea what BS or blood tests\par no signs of acute infection both feet\par no ulcerations no signs of infections

## 2022-08-19 NOTE — PLAN
[FreeTextEntry1] : debridement of all nails 1,2,3,4,5 both feet with sterile nail clipper both feet\par debride all hyperkeratotic tissue lesions submet 2 right foot with sterile #10 blade down tot he level of the epidermal tissue\par apply alcohol to all interspaces\par no need for oral antibiosis no signs of infection\par recommend patient's daughter takes her to a podiatrist every 2-3 months and never let toenails get this neglected again\par risk of infection and problems if she continues to neglect her thick, mycotic dystrophic discolored nails both feet\par recommend patient follow up with podiatrist\par recommend patient wear extra depth shoes and inserts from outside podiatrist\par

## 2022-09-22 ENCOUNTER — APPOINTMENT (OUTPATIENT)
Dept: HOME HEALTH SERVICES | Facility: HOME HEALTH | Age: 77
End: 2022-09-22

## 2022-09-22 ENCOUNTER — NON-APPOINTMENT (OUTPATIENT)
Age: 77
End: 2022-09-22

## 2022-09-22 VITALS — HEART RATE: 72 BPM | OXYGEN SATURATION: 97 % | DIASTOLIC BLOOD PRESSURE: 58 MMHG | SYSTOLIC BLOOD PRESSURE: 124 MMHG

## 2022-09-22 DIAGNOSIS — K21.9 GASTRO-ESOPHAGEAL REFLUX DISEASE W/OUT ESOPHAGITIS: ICD-10-CM

## 2022-09-22 PROCEDURE — 99350 HOME/RES VST EST HIGH MDM 60: CPT

## 2022-09-23 ENCOUNTER — LABORATORY RESULT (OUTPATIENT)
Age: 77
End: 2022-09-23

## 2022-09-23 PROBLEM — K21.9 CHRONIC GERD: Status: ACTIVE | Noted: 2022-09-23

## 2022-09-23 NOTE — HISTORY OF PRESENT ILLNESS
[FreeTextEntry1] : N/A, [facility] [FreeTextEntry2] : COVID SCREEN:\par Patient or caretaker denies fever, cough, trouble breathing, rash, vomiting. Patient has not been in close contact with anyone who is COVID-19 positive, or suspected of having COVID-19.\par \par N95 mask, gloves, eye wear and gown (if indicated) used during visit: Yes.\par \par Total face to face time with patient is 60 min.\par \par HPI:\par 75yo F with PMH early stage dementia, MDD, HTN, HLD, T2DM, hypothyroidism, LBBB, h/o dizziness/vertigo. Seen today for routine f/u visit.\par \par Social/Home Environment: Speaks primarily Malayalam, limited English. Strongly Scientologist, Buddhism. Father was a  from 2000-year-old tradition based in [place]. As such, she is adamant against certain forms of medical intervention & seeking care. \par -Daughter Siomara is , with master's in genetics, former schoolteacher. Now a preacher, also believes in blayne healing.\par -Family from [place] [facility]. Pt has three other daughters, 2 in [facility], 1 in [place], [place] ([facility])\par -[employer]first insurance\par -Neurologist Dr. Luc Judge\par -PCP Dr. Alicia Worley\par \par Interval Events:\par -Interviewed with daughter Siomara, who primarily speaks for pt.  also around.\par -Apparent difficulty with making appointment, HC has had difficulty reaching daughter, but she always has phone on and has noted no calls from [facility]. Provider was able to reach daughter for today's visit.\par -Noted blood while washing pt's nails. Nails appear fine on today's visit.\par -Dtr notes patient has been talking about visiting [place], packing her bags, talking about snakes coming in through the windows.\par -Declines vaccination for flu or COVID\par -Dtr seeking podiatrist, but was able to make collins't with Dr. Asif (will be 1-2 months in future)\par -Recommended to see neurologist, but daughter concerned about taking \par -Previously recommended for nuclear stress test, but had reaction to injected dye. Daughter not interested in this either.\par -Burning in stomach, despite eating same diet as rest of family. Fish congee (rice porridge), yogurt based meals. Nothing spicy.\par -Daughter states she is not financially stable. She wants this clarified in notes.\par -Dtr states she doesn't like to take showers or bathe (hasn't done so for 3 weeks).\par -"Wild imagination" concerns daughter, hallucinations that seem worse as time goes on. Reviewed that dementia is by definition progressive.\par -CMP, TFTs, CBC, lipid panel, A1C, sed rate, CRP ordered today\par -In-home PT referral again sent to [facility] today\par -Dtr voices concern that prilosec can cause cancer based on a prior recall. Pantoprazole caused Siomara's daughter to hallucinate. Overall, found that no medication is acceptable. She instead wants to give probiotics including yogurt & gummies that she found & likes.\par \par Subjective:\par 1. Appetite/Weight: Appetite fair.\par 2. Gait/Falls: Unsteady, walks grabbing furniture\par 3. Sleep: Naps during day, early in morning.\par 4. BMs: No concerns, able to use toilet but has to be queued.\par 5. Urine: No concerns, able to use toilet but has to be queued.\par 6. Skin: No rash or ulcers.\par 7. Mood/Memory: Memory impaired, mood good.\par \par DME: none

## 2022-09-23 NOTE — PHYSICAL EXAM
[de-identified] : Seated in chair, comfortable, good eye contact. [de-identified] : R eye ptosis [de-identified] : Large ventral hernia R of midline adjacent to umilicus. [de-identified] : 1.5x1.5cm dark brown/purple papule overlying 5th rib just R of sternum. [de-identified] : Reactive affect. Oriented to place, person, daughter, not date. Thought content hyperreligious. [de-identified] : Long toenails with onychomycosis of all nails.

## 2022-09-26 ENCOUNTER — LABORATORY RESULT (OUTPATIENT)
Age: 77
End: 2022-09-26

## 2022-09-27 ENCOUNTER — LABORATORY RESULT (OUTPATIENT)
Age: 77
End: 2022-09-27

## 2022-10-03 ENCOUNTER — LABORATORY RESULT (OUTPATIENT)
Age: 77
End: 2022-10-03

## 2022-10-04 ENCOUNTER — LABORATORY RESULT (OUTPATIENT)
Age: 77
End: 2022-10-04

## 2022-10-05 ENCOUNTER — LABORATORY RESULT (OUTPATIENT)
Age: 77
End: 2022-10-05

## 2022-10-17 ENCOUNTER — LABORATORY RESULT (OUTPATIENT)
Age: 77
End: 2022-10-17

## 2022-10-18 ENCOUNTER — LABORATORY RESULT (OUTPATIENT)
Age: 77
End: 2022-10-18

## 2022-10-25 ENCOUNTER — LABORATORY RESULT (OUTPATIENT)
Age: 77
End: 2022-10-25

## 2022-10-25 RX ORDER — LEVOTHYROXINE SODIUM 0.03 MG/1
25 TABLET ORAL
Qty: 90 | Refills: 3 | Status: COMPLETED | COMMUNITY
Start: 2022-05-31 | End: 2022-10-25

## 2022-10-26 ENCOUNTER — NON-APPOINTMENT (OUTPATIENT)
Age: 77
End: 2022-10-26

## 2022-10-27 ENCOUNTER — NON-APPOINTMENT (OUTPATIENT)
Age: 77
End: 2022-10-27

## 2022-11-07 RX ORDER — LEVOTHYROXINE SODIUM 0.05 MG/1
50 TABLET ORAL
Qty: 90 | Refills: 3 | Status: COMPLETED | COMMUNITY
Start: 2022-10-25 | End: 2022-11-07

## 2022-11-08 ENCOUNTER — NON-APPOINTMENT (OUTPATIENT)
Age: 77
End: 2022-11-08

## 2022-11-30 ENCOUNTER — NON-APPOINTMENT (OUTPATIENT)
Age: 77
End: 2022-11-30

## 2022-12-05 ENCOUNTER — APPOINTMENT (OUTPATIENT)
Dept: HOME HEALTH SERVICES | Facility: HOME HEALTH | Age: 77
End: 2022-12-05

## 2022-12-12 ENCOUNTER — NON-APPOINTMENT (OUTPATIENT)
Age: 77
End: 2022-12-12

## 2022-12-12 ENCOUNTER — TRANSCRIPTION ENCOUNTER (OUTPATIENT)
Age: 77
End: 2022-12-12

## 2022-12-13 ENCOUNTER — APPOINTMENT (OUTPATIENT)
Dept: HOME HEALTH SERVICES | Facility: HOME HEALTH | Age: 77
End: 2022-12-13

## 2022-12-13 ENCOUNTER — NON-APPOINTMENT (OUTPATIENT)
Age: 77
End: 2022-12-13

## 2022-12-13 VITALS
RESPIRATION RATE: 18 BRPM | HEART RATE: 76 BPM | TEMPERATURE: 98 F | DIASTOLIC BLOOD PRESSURE: 80 MMHG | SYSTOLIC BLOOD PRESSURE: 130 MMHG | OXYGEN SATURATION: 98 %

## 2022-12-13 DIAGNOSIS — Z87.898 PERSONAL HISTORY OF OTHER SPECIFIED CONDITIONS: ICD-10-CM

## 2022-12-13 DIAGNOSIS — B35.1 TINEA UNGUIUM: ICD-10-CM

## 2022-12-13 PROCEDURE — 99349 HOME/RES VST EST MOD MDM 40: CPT

## 2022-12-13 RX ORDER — BENZONATATE 100 MG/1
100 CAPSULE ORAL
Qty: 30 | Refills: 0 | Status: COMPLETED | COMMUNITY
Start: 2022-01-13 | End: 2022-12-13

## 2022-12-14 PROBLEM — B35.1 ONYCHOMYCOSIS: Status: ACTIVE | Noted: 2022-01-21

## 2022-12-14 NOTE — REASON FOR VISIT
[Follow-Up] : a follow-up visit [Pre-Visit Preparation] : pre-visit preparation was done [FreeTextEntry2] : chart review

## 2022-12-14 NOTE — HISTORY OF PRESENT ILLNESS
[Patient] : patient [Family Member] : family member [FreeTextEntry1] : N/A, [facility] [FreeTextEntry2] : COVID SCREEN:\par Patient or caretaker denies fever, cough, trouble breathing, rash, vomiting. Patient has not been in close contact with anyone who is COVID-19 positive, or suspected of having COVID-19.\par \par N95 mask, gloves, eye wear and gown (if indicated) used during visit: Yes.\par \par Total face to face time with patient is 50 min.\par \par HPI:\par 75yo F with PMH early stage dementia, MDD, HTN, HLD, T2DM, hypothyroidism, LBBB, h/o dizziness/vertigo. Seen today for routine f/u visit.\par \par Social/Home Environment: Speaks primarily Malayalam, limited English. Strongly Mu-ism, Cheondoism. Father was a  from 2000-year-old tradition based in [place]. As such, she is adamant against certain forms of medical intervention & seeking care. \par -Daughter Siomara is , with master's in genetics, former schoolteacher. Now a preacher, also believes in blayne healing.\par -Family from [facility]. Pt has three other daughters, 2 in [place], 1 in [place], [place] ([employer])\par -[employer] insurance\par -Neurologist Dr. Luc Judge\par -PCP Dr. Alicia Worley\par \par Interval Events:\par -Interviewed with daughter Siomara, who primarily speaks for pt.  also around.\par - Apparent difficulty with making appointment,  difficulty reaching daughter.\par -Dtr notes patient has been talking about visiting [place], packing her bags, seeing things - hallucinating, waking in the night and walking, "Wild imagination"  -  ordered Seroquel - Recommended to see neurologist, but daughter concerned about taking her mother out\par - cdpass form filled out\par -  left great - ingrown nail - continue bacitracin, f/u with podiatrist - Dr. Asif\par - Declines vaccination for flu or COVID\par \par \par Subjective:\par 1. Appetite/Weight: Appetite fair.\par 2. Gait/Falls: Unsteady, walks grabbing furniture\par 3. Sleep: Naps during day, early in morning.\par 4. BMs: No concerns, able to use toilet but has to be queued.\par 5. Urine: No concerns, able to use toilet but has to be queued.\par 6. Skin: No rash or ulcers.\par 7. Mood/Memory: Memory impaired, mood good.\par \par DME: none

## 2022-12-14 NOTE — HEALTH RISK ASSESSMENT
[HRA Reviewed] : Health risk assessment reviewed [Some assistance needed] : feeding [Full assistance needed] : managing finances [No falls in past year] : Patient reported no falls in the past year [No] : The patient does not have visual impairment [TimeGetUpGo] : 8

## 2022-12-14 NOTE — PHYSICAL EXAM
[No Acute Distress] : no acute distress [Well Nourished] : well nourished [Well Developed] : well developed [Normal Voice/Communication] : normal voice communication [Normal Sclera/Conjunctiva] : normal sclera/conjunctiva [EOMI] : extra ocular movement intact [Normal Outer Ear/Nose] : the ears and nose were normal in appearance [Normal Oropharynx] : the oropharynx was normal [No JVD] : no jugular venous distention [Supple] : the neck was supple [No LAD] : no lymphadenopathy [No Respiratory Distress] : no respiratory distress [No Accessory Muscle Use] : no accessory muscle use [No LE Varicosities] : there were no varicosital changes in the lower extremities [Pedal Pulses Present] : the pedal pulses are present [No Edema] : there was no peripheral edema [Non Tender] : non-tender [Soft] : abdomen soft [Not Distended] : not distended [Normal Post Cervical Nodes] : no posterior cervical lymphadenopathy [Normal Anterior Cervical Nodes] : no anterior cervical lymphadenopathy [Normal Gait] : normal gait [No Joint Swelling] : no joint swelling seen [No Clubbing, Cyanosis] : no clubbing  or cyanosis of the fingernails [Normal Strength/Tone] : muscle strength and tone were normal [No Rash] : no rash [Cranial Nerves Intact] : cranial nerves 2-12 were intact [No Gross Sensory Deficits] : no gross sensory deficits [No Motor Deficits] : the motor exam was normal [Oriented x3] : oriented to person, place, and time [Normal Affect] : the affect was normal [Normal Mood] : the mood was normal [Normal Rate] : heart rate was normal  [Breast Exam Declined] : patient declined to have breast exam done [Patient Refused] : rectal exam was refused by the patient [de-identified] : Seated in chair, comfortable, good eye contact. [de-identified] : R eye ptosis [de-identified] : Large ventral hernia R of midline adjacent to umilicus. [de-identified] : 1.5x1.5cm dark brown/purple papule overlying 5th rib just R of sternum. [de-identified] : Reactive affect. Oriented to place, person, daughter, not date. Thought content hyperreligious. [de-identified] : Long toenails with onychomycosis of all nails.

## 2023-01-20 ENCOUNTER — NON-APPOINTMENT (OUTPATIENT)
Age: 78
End: 2023-01-20

## 2023-01-23 ENCOUNTER — APPOINTMENT (OUTPATIENT)
Dept: HOME HEALTH SERVICES | Facility: HOME HEALTH | Age: 78
End: 2023-01-23

## 2023-03-21 ENCOUNTER — RX RENEWAL (OUTPATIENT)
Age: 78
End: 2023-03-21

## 2023-03-23 ENCOUNTER — NON-APPOINTMENT (OUTPATIENT)
Age: 78
End: 2023-03-23

## 2023-03-24 ENCOUNTER — APPOINTMENT (OUTPATIENT)
Dept: HOME HEALTH SERVICES | Facility: HOME HEALTH | Age: 78
End: 2023-03-24

## 2023-03-27 ENCOUNTER — RX RENEWAL (OUTPATIENT)
Age: 78
End: 2023-03-27

## 2023-04-06 ENCOUNTER — LABORATORY RESULT (OUTPATIENT)
Age: 78
End: 2023-04-06

## 2023-04-10 ENCOUNTER — LABORATORY RESULT (OUTPATIENT)
Age: 78
End: 2023-04-10

## 2023-04-13 ENCOUNTER — APPOINTMENT (OUTPATIENT)
Dept: HOME HEALTH SERVICES | Facility: HOME HEALTH | Age: 78
End: 2023-04-13
Payer: MEDICARE

## 2023-04-13 VITALS
DIASTOLIC BLOOD PRESSURE: 82 MMHG | HEART RATE: 78 BPM | OXYGEN SATURATION: 98 % | SYSTOLIC BLOOD PRESSURE: 130 MMHG | TEMPERATURE: 97.8 F | RESPIRATION RATE: 17 BRPM

## 2023-04-13 PROCEDURE — 99350 HOME/RES VST EST HIGH MDM 60: CPT

## 2023-04-13 RX ORDER — MUPIROCIN 20 MG/G
2 OINTMENT TOPICAL TWICE DAILY
Qty: 1 | Refills: 3 | Status: COMPLETED | COMMUNITY
Start: 2022-08-15 | End: 2023-04-13

## 2023-04-13 NOTE — COUNSELING
[Overweight - ( BMI 25.1 - 29.9 )] : overweight -  ( BMI 25.1 - 29.9 ) [Continue diet as tolerated] : continue diet as tolerated based on goals of care [Non - Smoker] : non-smoker [Remove clutter and unsafe carpeting to avoid falls] : remove clutter and unsafe carpeting to avoid falls [] : foot exam [Completed] : Aspirin use discussion completed [Improve mobility] : improve mobility [Decrease hospital use] : decrease hospital use [Minimize unnecessary interventions] : minimize unnecessary interventions [Maintain functional ability] : maintain functional ability [Patient/Caregiver has ___ understanding of disease process] : patient/caregiver has [unfilled] understanding of disease process [Completed Medical Orders for Life-Sustaining Treatment] : completed medical orders for life-sustaining treatment [Full Code] : Code Status: Full Code [No Limitations] : Treatment Guidelines: No limitations [Trial of Intubation] : Intubation: Trial of Intubation [Last Ver[facility] Date: _____] : [facility] Completion/last verification date: [unfilled] [_____] : HCP: [unfilled]

## 2023-04-18 NOTE — PHYSICAL EXAM
[No Acute Distress] : no acute distress [Normal Voice/Communication] : normal voice communication [Normal Sclera/Conjunctiva] : normal sclera/conjunctiva [EOMI] : extra ocular movement intact [Normal Outer Ear/Nose] : the ears and nose were normal in appearance [Normal Oropharynx] : the oropharynx was normal [No JVD] : no jugular venous distention [Supple] : the neck was supple [No LAD] : no lymphadenopathy [No Respiratory Distress] : no respiratory distress [No Accessory Muscle Use] : no accessory muscle use [Normal Rate] : heart rate was normal  [No LE Varicosities] : there were no varicosital changes in the lower extremities [Pedal Pulses Present] : the pedal pulses are present [No Edema] : there was no peripheral edema [Breast Exam Declined] : patient declined to have breast exam done [Non Tender] : non-tender [Soft] : abdomen soft [Not Distended] : not distended [Patient Refused] : rectal exam was refused by the patient [Normal Post Cervical Nodes] : no posterior cervical lymphadenopathy [Normal Anterior Cervical Nodes] : no anterior cervical lymphadenopathy [Normal Gait] : normal gait [No Joint Swelling] : no joint swelling seen [No Clubbing, Cyanosis] : no clubbing  or cyanosis of the fingernails [Normal Strength/Tone] : muscle strength and tone were normal [No Rash] : no rash [Cranial Nerves Intact] : cranial nerves 2-12 were intact [No Motor Deficits] : the motor exam was normal [No Gross Sensory Deficits] : no gross sensory deficits [Oriented x3] : oriented to person, place, and time [Normal Affect] : the affect was normal [Normal Mood] : the mood was normal [de-identified] : Seated in chair, comfortable, good eye contact. [de-identified] : R eye ptosis [de-identified] : 1.5x1.5cm dark brown/purple papule overlying 5th rib just R of sternum. [de-identified] : Reactive affect. Oriented to place, person, daughter, not date. Thought content hyperreligious.

## 2023-04-18 NOTE — HISTORY OF PRESENT ILLNESS
[Patient] : patient [Family Member] : family member [FreeTextEntry1] : N/A, [facility] [FreeTextEntry2] : HPI:\par 76yo F with PMH early stage dementia, MDD, HTN, HLD, T2DM, hypothyroidism, LBBB, h/o dizziness/vertigo. Seen today for routine f/u visit.\par \par Social/Home Environment: Speaks primarily Malayalam, limited English. Strongly Yazidism, Scientology. Father was a  from 2000-year-old tradition based in [place]. As such, she is adamant against certain forms of medical intervention & seeking care. \par -Daughter Siomara is , with master's in genetics, former schoolteacher. Now a preacher, also believes in blayne healing.\par -Family from [facility]. Pt has three other daughters, 2 in [facility], 1 in [place], [place] ([facility])\par -[employer] insurance\par -Neurologist Dr. Luc Judge\par - podiatrist - Dr. Asif\par -PCP Dr. Alicia Worley\par \par Interval Events:\par -Interviewed with daughter Siomara, who primarily speaks for pt. \par -Dtr states patient keeps talking about visiting [place], packing her bags then unpacks regularly, seeing things - hallucinating, waking in the night and walking. Daughter states she did not give pt Quetiapine 25mg as she feels it causes her moms stomach to get upset so does not want to give mom too many medications...Recommended to see neurologist, but daughter concerned about taking her mother out\par - cdpass form due in June\par - Declines vaccination for flu or COVID\par - Went over the lab results from 4/10/23 with daughter\par \par Subjective:\par 1. Appetite/Weight: Appetite fair.\par 2. Gait/Falls: Unsteady, walks grabbing furniture\par 3. Sleep: Naps during day, early in morning.\par 4. BMs: No concerns, able to use toilet but has to be queued.\par 5. Urine: No concerns, able to use toilet but has to be queued.\par 6. Skin: No rash or ulcers.\par 7. Mood/Memory: Memory impaired, mood good. Pt and daughter noncompliant with medication recommendation. \par \par DME: none

## 2023-04-18 NOTE — REASON FOR VISIT
[Follow-Up] : a follow-up visit [Family Member] : family member [Pre-Visit Preparation] : pre-visit preparation was done [FreeTextEntry2] : chart review

## 2023-05-02 ENCOUNTER — FORM ENCOUNTER (OUTPATIENT)
Age: 78
End: 2023-05-02

## 2023-05-19 ENCOUNTER — NON-APPOINTMENT (OUTPATIENT)
Age: 78
End: 2023-05-19

## 2023-05-23 ENCOUNTER — APPOINTMENT (OUTPATIENT)
Dept: HOME HEALTH SERVICES | Facility: HOME HEALTH | Age: 78
End: 2023-05-23

## 2023-05-25 ENCOUNTER — NON-APPOINTMENT (OUTPATIENT)
Age: 78
End: 2023-05-25

## 2023-08-08 ENCOUNTER — NON-APPOINTMENT (OUTPATIENT)
Age: 78
End: 2023-08-08

## 2023-09-11 ENCOUNTER — NON-APPOINTMENT (OUTPATIENT)
Age: 78
End: 2023-09-11

## 2023-10-04 ENCOUNTER — APPOINTMENT (OUTPATIENT)
Dept: HOME HEALTH SERVICES | Facility: HOME HEALTH | Age: 78
End: 2023-10-04
Payer: MEDICARE

## 2023-10-04 VITALS
RESPIRATION RATE: 16 BRPM | OXYGEN SATURATION: 99 % | TEMPERATURE: 98 F | DIASTOLIC BLOOD PRESSURE: 76 MMHG | SYSTOLIC BLOOD PRESSURE: 136 MMHG | HEART RATE: 98 BPM

## 2023-10-04 DIAGNOSIS — E78.5 HYPERLIPIDEMIA, UNSPECIFIED: ICD-10-CM

## 2023-10-04 DIAGNOSIS — E03.9 HYPOTHYROIDISM, UNSPECIFIED: ICD-10-CM

## 2023-10-04 DIAGNOSIS — T81.43XA INFECTION FOLLOWING A PROCEDURE, ORGAN AND SPACE SURGICAL SITE, INITIAL ENCTR: ICD-10-CM

## 2023-10-04 PROCEDURE — 99349 HOME/RES VST EST MOD MDM 40: CPT

## 2023-10-04 RX ORDER — QUETIAPINE FUMARATE 25 MG/1
25 TABLET ORAL
Qty: 30 | Refills: 0 | Status: DISCONTINUED | COMMUNITY
Start: 2022-12-13 | End: 2023-10-04

## 2023-10-06 ENCOUNTER — LABORATORY RESULT (OUTPATIENT)
Age: 78
End: 2023-10-06

## 2023-10-09 ENCOUNTER — LABORATORY RESULT (OUTPATIENT)
Age: 78
End: 2023-10-09

## 2023-10-10 ENCOUNTER — LABORATORY RESULT (OUTPATIENT)
Age: 78
End: 2023-10-10

## 2023-10-11 ENCOUNTER — LABORATORY RESULT (OUTPATIENT)
Age: 78
End: 2023-10-11

## 2023-10-12 ENCOUNTER — NON-APPOINTMENT (OUTPATIENT)
Age: 78
End: 2023-10-12

## 2023-11-03 ENCOUNTER — RX RENEWAL (OUTPATIENT)
Age: 78
End: 2023-11-03

## 2023-11-21 ENCOUNTER — NON-APPOINTMENT (OUTPATIENT)
Age: 78
End: 2023-11-21

## 2023-11-21 ENCOUNTER — APPOINTMENT (OUTPATIENT)
Dept: HOME HEALTH SERVICES | Facility: HOME HEALTH | Age: 78
End: 2023-11-21

## 2023-11-29 ENCOUNTER — NON-APPOINTMENT (OUTPATIENT)
Age: 78
End: 2023-11-29

## 2024-02-09 ENCOUNTER — APPOINTMENT (OUTPATIENT)
Dept: HOME HEALTH SERVICES | Facility: HOME HEALTH | Age: 79
End: 2024-02-09
Payer: MEDICARE

## 2024-02-09 VITALS
DIASTOLIC BLOOD PRESSURE: 60 MMHG | OXYGEN SATURATION: 99 % | TEMPERATURE: 97.6 F | RESPIRATION RATE: 18 BRPM | SYSTOLIC BLOOD PRESSURE: 114 MMHG | HEART RATE: 54 BPM

## 2024-02-09 DIAGNOSIS — Z71.89 OTHER SPECIFIED COUNSELING: ICD-10-CM

## 2024-02-09 DIAGNOSIS — I10 ESSENTIAL (PRIMARY) HYPERTENSION: ICD-10-CM

## 2024-02-09 DIAGNOSIS — R26.81 UNSTEADINESS ON FEET: ICD-10-CM

## 2024-02-09 PROCEDURE — 99350 HOME/RES VST EST HIGH MDM 60: CPT | Mod: 25

## 2024-02-09 PROCEDURE — 99497 ADVNCD CARE PLAN 30 MIN: CPT

## 2024-02-09 NOTE — COUNSELING
[Overweight - ( BMI 25.1 - 29.9 )] : overweight -  ( BMI 25.1 - 29.9 ) [Continue diet as tolerated] : continue diet as tolerated based on goals of care [Non - Smoker] : non-smoker [Remove clutter and unsafe carpeting to avoid falls] : remove clutter and unsafe carpeting to avoid falls [] : foot exam [Completed] : Aspirin use discussion completed [Improve mobility] : improve mobility [Decrease hospital use] : decrease hospital use [Minimize unnecessary interventions] : minimize unnecessary interventions [Maintain functional ability] : maintain functional ability [Patient/Caregiver has ___ understanding of disease process] : patient/caregiver has [unfilled] understanding of disease process [Completed Medical Orders for Life-Sustaining Treatment] : completed medical orders for life-sustaining treatment [Full Code] : Code Status: Full Code [No Limitations] : Treatment Guidelines: No limitations [Trial of Intubation] : Intubation: Trial of Intubation [Last Verification Date: _____] : [facility] Completion/last verification date: [unfilled] [_____] : HCP: [unfilled]

## 2024-02-12 PROBLEM — I10 BENIGN ESSENTIAL HTN: Status: ACTIVE | Noted: 2019-06-03

## 2024-02-12 PROBLEM — R26.81 UNSTEADY GAIT: Status: ACTIVE | Noted: 2022-09-22

## 2024-02-12 PROBLEM — Z71.89 ACP (ADVANCE CARE PLANNING): Status: ACTIVE | Noted: 2024-02-12

## 2024-02-12 NOTE — PHYSICAL EXAM
[Normal Voice/Communication] : normal voice communication [No JVD] : no jugular venous distention [Supple] : the neck was supple [No LAD] : no lymphadenopathy [No LE Varicosities] : there were no varicosital changes in the lower extremities no [Breast Exam Declined] : patient declined to have breast exam done [Non Tender] : non-tender [Patient Refused] : rectal exam was refused by the patient [Normal Gait] : normal gait [No Joint Swelling] : no joint swelling seen [No Clubbing, Cyanosis] : no clubbing  or cyanosis of the fingernails [No Gross Sensory Deficits] : no gross sensory deficits [Normal Mood] : the mood was normal [No Acute Distress] : no acute distress [Normal Sclera/Conjunctiva] : normal sclera/conjunctiva [EOMI] : extra ocular movement intact [Normal Outer Ear/Nose] : the ears and nose were normal in appearance [Normal Oropharynx] : the oropharynx was normal [No Respiratory Distress] : no respiratory distress [Clear to Auscultation] : lungs were clear to auscultation bilaterally [No Accessory Muscle Use] : no accessory muscle use [Normal Rate] : heart rate was normal  [Regular Rhythm] : with a regular rhythm [Normal S1, S2] : normal S1 and S2 [No Murmurs] : no murmurs heard [No Edema] : there was no peripheral edema [Normal Bowel Sounds] : normal bowel sounds [Soft] : abdomen soft [Not Distended] : not distended [Normal Post Cervical Nodes] : no posterior cervical lymphadenopathy [Normal Anterior Cervical Nodes] : no anterior cervical lymphadenopathy [No CVA Tenderness] : no ~M costovertebral angle tenderness [No Spinal Tenderness] : no spinal tenderness [Normal Strength/Tone] : muscle strength and tone were normal [No Rash] : no rash [Normal Affect] : the affect was normal [de-identified] : healed laceration at back of head [de-identified] : healed laceration on back of head s/p fall [de-identified] : lower abd distention [de-identified] : unsteady gait [de-identified] : confused, forgetful.

## 2024-02-12 NOTE — REVIEW OF SYSTEMS
[Negative] : Heme/Lymph [FreeTextEntry7] : as noted in HPI [de-identified] : as noted in HPI [de-identified] : as noted in HPI

## 2024-02-12 NOTE — HISTORY OF PRESENT ILLNESS
[Patient] : patient [Family Member] : family member [FreeTextEntry1] : [employer] patient, hx of dementia, unsteady gait [FreeTextEntry2] : PMH: Dementia, MDD, HTN, HLD, T2DM, hypothyroidism, LBBB, h/o dizziness/vertigo.  -Interviewed with daughter Siomara, who primarily speaks for pt.  -Pt noncompliant with medications   Pt travelled to [place] in August to visit [place] and pt had a fall there at night on 9/8/23. Family saw the next morning that she had laceration in the back of head with dried blood visible. Family decided not to take her to ER and treated her at home. On exam today head laceration is healed. Pt also had an episode where pt wandered out of house in [place] and was found by family. Pt returned back to [place] with daughter on 10/3/23.    Daughter states pt complains of burning sensation in the abd and refuses to eat sometimes. As per daughter pt has hx of 4 hernia in the past in [place] with surgical repair where mesh was placed but had to be removed later. Lower abd distention noted on physical examination, abd soft, pt denies pain at this time - Abdominal US was done 10/27/22 for similar complains which showed: no definitive evidence of right hernia. No cholelithiasis or acute cholecystitis. No hydronephrosis. 1.8 cm cyst of the upper pole right kidney.  - Repeat abd US denied by the daughter as the US abd last year did not show anything.  Daughter states she is able to manage the symptoms with "home remedies" and will continue to monitor her mom and will call house calls if anything changes. Recommended to Follow up with GI  Skin: dry skin. Uses olive oil. Cut on head is still healing.  Pain: Stomach pains, side pain after fall.  Gait/falls: Has walker but does not remember how to use. Having falls, dec 3rd while walking, mechanical fall. dec 30th fell out of bed.  Appetite: eats well but forgets that she ate.  BM: ok Urine: ok, incontinent Sleep: wont go to sleep at night, hallucinations  Dementia with psychosis: -Chronic, progressive - hallucinations are worsening. Talking to all pictures in house. "its getting dark i have to get to my home" but uses tv to calm her down. Sometimes aggressive or violent. "where is the stick. I am going to beat you". Verbally aggressive.  -Pt redirectable but needs continuous supervision.  -Has fairly preserved long-term memory, but short term deficits noted by daughter. Eats then forgets she eats, or insists she's already eaten. Same with bathing. -waking in the night and walking around, does not want to shower. Daughter states it's getting harder to take care of her but she has to as she cannot send her to nursing home.  -Daughter willing to try quetiapine as patient is up all night and hallucinations are getting worse.  -Recommended to see neurologist again, but daughter states mother does not want to go out   DM: Chronic, controlled -A1C 4/10/2023 6.2%, will recheck A1C -Daughter states she is giving metformin ER 500mg every other day as she feels her "sugar may go hypoglycemic" and states she has been managing with diet control -encouraged daughter to check her FS daily and keep a log in order to monitor the trend -educated daughter to follow diabetic diet and report any hypoglycemic episodes  HLD: Atorvastatin Calcium 20mg Daily  Hypothyroidism: Levothyroxine Sodium 75mcg daily  HTN: Telmisartan 20mg daily Spironolactone 25mg Daily  DME: none  Social/Home Environment: Speaks primarily Malayalam, limited English. Strongly Baptism, Taoism. Father was a  from 2000-year-old tradition based in [place]. As such, she is adamant against certain forms of medical intervention & seeking care.  -Daughter Siomara is , with master's in genetics, former schoolteacher. Now a preacher, also believes in blayne healing. -Family from [facility]. Pt has three other daughters, 2 in [place], 1 in [facility], [place] ([facility]) -[employer] insurance -Neurologist Dr. Luc Judge - podiatrist - Dr. Asif -PCP Dr. Alicia Worley

## 2024-02-12 NOTE — ASSESSMENT
[FreeTextEntry1] : Letter that patient should not be flying.  labs next visit Patient unable to tolerate pills. daughter wants to stop all unnecessary medications. Stopped statin.

## 2024-02-12 NOTE — REASON FOR VISIT
[Follow-Up] : a follow-up visit [Family Member] : family member [Pre-Visit Preparation] : pre-visit preparation was done [FreeTextEntry1] : Dementia [FreeTextEntry2] : chart review

## 2024-02-23 ENCOUNTER — TRANSCRIPTION ENCOUNTER (OUTPATIENT)
Age: 79
End: 2024-02-23

## 2024-04-19 ENCOUNTER — APPOINTMENT (OUTPATIENT)
Dept: HOME HEALTH SERVICES | Facility: HOME HEALTH | Age: 79
End: 2024-04-19
Payer: MEDICARE

## 2024-04-19 VITALS
DIASTOLIC BLOOD PRESSURE: 66 MMHG | RESPIRATION RATE: 17 BRPM | HEART RATE: 56 BPM | OXYGEN SATURATION: 98 % | TEMPERATURE: 97.9 F | SYSTOLIC BLOOD PRESSURE: 116 MMHG

## 2024-04-19 DIAGNOSIS — Z79.899 OTHER LONG TERM (CURRENT) DRUG THERAPY: ICD-10-CM

## 2024-04-19 DIAGNOSIS — F03.918 UNSPECIFIED DEMENTIA, UNSPECIFIED SEVERITY, WITH OTHER BEHAVIORAL DISTURBANCE: ICD-10-CM

## 2024-04-19 DIAGNOSIS — F29 UNSPECIFIED PSYCHOSIS NOT DUE TO A SUBSTANCE OR KNOWN PHYSIOLOGICAL CONDITION: ICD-10-CM

## 2024-04-19 DIAGNOSIS — Z91.83 UNSPECIFIED DEMENTIA, UNSPECIFIED SEVERITY, WITH OTHER BEHAVIORAL DISTURBANCE: ICD-10-CM

## 2024-04-19 DIAGNOSIS — F03.90 UNSPECIFIED DEMENTIA W/OUT BEHAVIORAL DISTURBANCE: ICD-10-CM

## 2024-04-19 DIAGNOSIS — E11.9 TYPE 2 DIABETES MELLITUS W/OUT COMPLICATIONS: ICD-10-CM

## 2024-04-19 PROCEDURE — 99348 HOME/RES VST EST LOW MDM 30: CPT

## 2024-04-20 PROBLEM — F03.90: Status: ACTIVE | Noted: 2022-06-10

## 2024-04-20 PROBLEM — E11.9 CONTROLLED TYPE 2 DIABETES MELLITUS WITHOUT COMPLICATION, WITHOUT LONG-TERM CURRENT USE OF INSULIN: Status: ACTIVE | Noted: 2022-03-14

## 2024-04-20 PROBLEM — F03.918 WANDERING BEHAVIOR DUE TO DEMENTIA: Status: ACTIVE | Noted: 2022-06-10

## 2024-04-20 PROBLEM — F29 PSYCHOSIS: Status: ACTIVE | Noted: 2022-12-13

## 2024-04-20 RX ORDER — SPIRONOLACTONE 25 MG/1
25 TABLET ORAL
Qty: 90 | Refills: 3 | Status: ACTIVE | COMMUNITY
Start: 2022-03-04

## 2024-04-20 RX ORDER — LEVOTHYROXINE SODIUM 0.07 MG/1
75 TABLET ORAL
Qty: 90 | Refills: 3 | Status: ACTIVE | COMMUNITY
Start: 2022-11-07

## 2024-04-20 RX ORDER — QUETIAPINE FUMARATE 25 MG/1
25 TABLET ORAL
Qty: 90 | Refills: 3 | Status: ACTIVE | COMMUNITY
Start: 2024-02-09

## 2024-04-20 NOTE — HISTORY OF PRESENT ILLNESS
[FreeTextEntry1] : [employer] patient, hx of dementia, unsteady gait [FreeTextEntry2] : PMH: Dementia, MDD, HTN, HLD, T2DM, hypothyroidism, LBBB, h/o dizziness/vertigo.  -Interviewed with daughter Siomara, who primarily speaks for pt.  -[facility] due and completed -Pt noncompliant with medications    Daughter states pt complains of burning sensation in the abd and refuses to eat sometimes. As per daughter pt has hx of 4 hernia in the past in [place] with surgical repair where mesh was placed but had to be removed later. Lower abd distention noted on physical examination, abd soft, pt denies pain at this time - Abdominal US was done 10/27/22 for similar complains which showed: no definitive evidence of right hernia. No cholelithiasis or acute cholecystitis. No hydronephrosis. 1.8 cm cyst of the upper pole right kidney.  - Repeat abd US denied by the daughter as the US abd last year did not show anything.  Daughter states she is able to manage the symptoms with "home remedies" and will continue to monitor her mom and will call house calls if anything changes. Recommended to Follow up with GI  Skin: dry skin. Uses olive oil. Cut on head is still healing.  Pain: Stomach pains, side pain after fall.  Gait/falls: Has walker but does not remember how to use. Having multipk falls in past years, needs supervision all the time Appetite: eats well but forgets that she ate.  BM: ok Urine: ok, incontinent Sleep: wont go to sleep at night, hallucinations  Dementia with psychosis: -Chronic, progressive - hallucinations are worsening. Talking to all pictures in house. "its getting dark i have to get to my home" but uses tv to calm her down. Sometimes aggressive or violent. "where is the stick. I am going to beat you". Verbally aggressive.  -Pt redirectable but needs continuous supervision.  -Has fairly preserved long-term memory, but short term deficits noted by daughter. Eats then forgets she eats, or insists she's already eaten. Same with bathing. -waking in the night and walking around, does not want to shower. Daughter states it's getting harder to take care of her but she has to as she cannot send her to nursing home.  -Daughter states she sometimes gives quetiapine as patient is up all night and hallucinations are getting worse.  -Recommended to see neurologist again, but daughter states mother does not want to go out   DM: Chronic, controlled -A1C 12/03/2023 5.9%, will recheck A1C -stopped metformin ER 500mg since last blood work -encouraged daughter to check her FS daily and keep a log in order to monitor the trend -educated daughter to follow diabetic diet and report any hypoglycemic episodes  HLD: stopped Atorvastatin Calcium 20mg Daily. dtr don't want to give too many meds to pt  Hypothyroidism: Levothyroxine Sodium 75mcg daily  HTN: Telmisartan 20mg daily Spironolactone 25mg Daily  DME: none  Social/Home Environment: Speaks primarily Malayalam, limited English. Strongly Sikh, Caodaism. Father was a  from 2000-year-old tradition based in [place]. As such, she is adamant against certain forms of medical intervention & seeking care.  -Daughter Siomara is , with master's in genetics, former schoolteacher. Now a preacher, also believes in blayne healing. -Family from [facility]. Pt has three other daughters, 2 in [place], 1 in [place], [place] (missionary) -[employer] insurance -Neurologist Dr. Luc Judge - podiatrist - Dr. Asif -PCP Dr. Alicia Worley

## 2024-04-20 NOTE — REVIEW OF SYSTEMS
[FreeTextEntry7] : as noted in HPI [de-identified] : as noted in HPI [de-identified] : as noted in HPI

## 2024-04-20 NOTE — HEALTH RISK ASSESSMENT
[Two or more falls in past year] : Patient reported two or more falls in the past year [TimeGetUpGo] : 8

## 2024-04-20 NOTE — PHYSICAL EXAM
[de-identified] : elderly lady sitting in chair [de-identified] : lower abd distention [de-identified] : unsteady gait [de-identified] : confused, forgetful.

## 2024-04-22 ENCOUNTER — LABORATORY RESULT (OUTPATIENT)
Age: 79
End: 2024-04-22

## 2024-04-23 ENCOUNTER — NON-APPOINTMENT (OUTPATIENT)
Age: 79
End: 2024-04-23

## 2024-04-24 LAB
25(OH)D3 SERPL-MCNC: 30.9 NG/ML
ALBUMIN SERPL ELPH-MCNC: 4.4 G/DL
ALP BLD-CCNC: 62 U/L
ALT SERPL-CCNC: 9 U/L
ANION GAP SERPL CALC-SCNC: 13 MMOL/L
AST SERPL-CCNC: 17 U/L
BASOPHILS # BLD AUTO: 0.09 K/UL
BASOPHILS NFR BLD AUTO: 1.5 %
BILIRUB SERPL-MCNC: 0.6 MG/DL
BUN SERPL-MCNC: 14 MG/DL
CALCIUM SERPL-MCNC: 10 MG/DL
CHLORIDE SERPL-SCNC: 106 MMOL/L
CHOLEST SERPL-MCNC: 285 MG/DL
CO2 SERPL-SCNC: 26 MMOL/L
CREAT SERPL-MCNC: 0.76 MG/DL
EGFR: 81 ML/MIN/1.73M2
EOSINOPHIL # BLD AUTO: 0.09 K/UL
EOSINOPHIL NFR BLD AUTO: 1.5 %
ESTIMATED AVERAGE GLUCOSE: 117 MG/DL
FERRITIN SERPL-MCNC: 164 NG/ML
GLUCOSE SERPL-MCNC: 103 MG/DL
HBA1C MFR BLD HPLC: 5.7 %
HCT VFR BLD CALC: 37.9 %
HDLC SERPL-MCNC: 64 MG/DL
HGB BLD-MCNC: 12.3 G/DL
IMM GRANULOCYTES NFR BLD AUTO: 0.2 %
LDLC SERPL CALC-MCNC: 194 MG/DL
LYMPHOCYTES # BLD AUTO: 3.35 K/UL
LYMPHOCYTES NFR BLD AUTO: 55.6 %
MAN DIFF?: NORMAL
MCHC RBC-ENTMCNC: 31.5 PG
MCHC RBC-ENTMCNC: 32.5 GM/DL
MCV RBC AUTO: 96.9 FL
MONOCYTES # BLD AUTO: 0.39 K/UL
MONOCYTES NFR BLD AUTO: 6.5 %
NEUTROPHILS # BLD AUTO: 2.09 K/UL
NEUTROPHILS NFR BLD AUTO: 34.7 %
NONHDLC SERPL-MCNC: 221 MG/DL
PLATELET # BLD AUTO: 233 K/UL
POTASSIUM SERPL-SCNC: 4.8 MMOL/L
PROT SERPL-MCNC: 6.6 G/DL
RBC # BLD: 3.91 M/UL
RBC # FLD: 12.8 %
SODIUM SERPL-SCNC: 144 MMOL/L
T4 FREE SERPL-MCNC: 1.3 NG/DL
TRIGL SERPL-MCNC: 145 MG/DL
TSH SERPL-ACNC: 2.45 UIU/ML
WBC # FLD AUTO: 6.02 K/UL

## 2024-04-25 ENCOUNTER — RX RENEWAL (OUTPATIENT)
Age: 79
End: 2024-04-25

## 2024-04-25 RX ORDER — METFORMIN ER 500 MG 500 MG/1
500 TABLET ORAL
Qty: 90 | Refills: 3 | Status: ACTIVE | COMMUNITY
Start: 2022-03-14 | End: 1900-01-01

## 2024-04-25 RX ORDER — ATORVASTATIN CALCIUM 20 MG/1
20 TABLET, FILM COATED ORAL
Qty: 90 | Refills: 3 | Status: ACTIVE | COMMUNITY
Start: 2021-12-15 | End: 1900-01-01

## 2024-05-16 ENCOUNTER — RX RENEWAL (OUTPATIENT)
Age: 79
End: 2024-05-16

## 2024-05-16 RX ORDER — TELMISARTAN 20 MG/1
20 TABLET ORAL
Qty: 90 | Refills: 3 | Status: ACTIVE | COMMUNITY
Start: 2021-11-18 | End: 1900-01-01

## 2024-07-23 ENCOUNTER — RX RENEWAL (OUTPATIENT)
Age: 79
End: 2024-07-23

## 2024-07-29 ENCOUNTER — TRANSCRIPTION ENCOUNTER (OUTPATIENT)
Age: 79
End: 2024-07-29

## 2024-07-29 ENCOUNTER — APPOINTMENT (OUTPATIENT)
Dept: HOME HEALTH SERVICES | Facility: HOME HEALTH | Age: 79
End: 2024-07-29
Payer: MEDICARE

## 2024-07-29 ENCOUNTER — NON-APPOINTMENT (OUTPATIENT)
Age: 79
End: 2024-07-29

## 2024-07-29 DIAGNOSIS — E11.9 TYPE 2 DIABETES MELLITUS W/OUT COMPLICATIONS: ICD-10-CM

## 2024-07-29 DIAGNOSIS — R53.83 OTHER FATIGUE: ICD-10-CM

## 2024-07-29 DIAGNOSIS — F03.90 UNSPECIFIED DEMENTIA W/OUT BEHAVIORAL DISTURBANCE: ICD-10-CM

## 2024-07-29 PROCEDURE — 99348 HOME/RES VST EST LOW MDM 30: CPT | Mod: 95

## 2024-07-30 PROBLEM — R53.83 LETHARGY: Status: ACTIVE | Noted: 2024-07-30

## 2024-09-30 ENCOUNTER — APPOINTMENT (OUTPATIENT)
Dept: HOME HEALTH SERVICES | Facility: HOME HEALTH | Age: 79
End: 2024-09-30

## 2024-09-30 NOTE — COUNSELING
[Overweight - ( BMI 25.1 - 29.9 )] : overweight -  ( BMI 25.1 - 29.9 ) [Continue diet as tolerated] : continue diet as tolerated based on goals of care [Non - Smoker] : non-smoker [Remove clutter and unsafe carpeting to avoid falls] : remove clutter and unsafe carpeting to avoid falls [] : foot exam [Completed] : Aspirin use discussion completed [Improve mobility] : improve mobility [Decrease hospital use] : decrease hospital use [Minimize unnecessary interventions] : minimize unnecessary interventions [Maintain functional ability] : maintain functional ability [Patient/Caregiver has ___ understanding of disease process] : patient/caregiver has [unfilled] understanding of disease process [Completed Medical Orders for Life-Sustaining Treatment] : completed medical orders for life-sustaining treatment [Full Code] : Code Status: Full Code [No Limitations] : Treatment Guidelines: No limitations [Trial of Intubation] : Intubation: Trial of Intubation [[facility] Date: _____] [facility]/last verification date: [unfilled] [_____] : HCP: [unfilled]

## 2024-09-30 NOTE — COUNSELING
[Overweight - ( BMI 25.1 - 29.9 )] : overweight -  ( BMI 25.1 - 29.9 ) [Continue diet as tolerated] : continue diet as tolerated based on goals of care [Non - Smoker] : non-smoker [Remove clutter and unsafe carpeting to avoid falls] : remove clutter and unsafe carpeting to avoid falls [] : foot exam [Completed] : Aspirin use discussion completed [Improve mobility] : improve mobility [Decrease hospital use] : decrease hospital use [Minimize unnecessary interventions] : minimize unnecessary interventions [Maintain functional ability] : maintain functional ability [Patient/Caregiver has ___ understanding of disease process] : patient/caregiver has [unfilled] understanding of disease process [Completed Medical Orders for Life-Sustaining Treatment] : completed medical orders for life-sustaining treatment [Full Code] : Code Status: Full Code [No Limitations] : Treatment Guidelines: No limitations [Trial of Intubation] : Intubation: Trial of Intubation [[facility] Date: _____] [facility]/last verification date: [unfilled] [_____] : HCP: [unfilled]

## 2024-10-02 NOTE — REVIEW OF SYSTEMS
[Negative] : Heme/Lymph [FreeTextEntry7] : as noted in HPI [de-identified] : as noted in HPI [de-identified] : as noted in HPI

## 2024-10-02 NOTE — PHYSICAL EXAM
[Normal Voice/Communication] : normal voice communication [No JVD] : no jugular venous distention [Supple] : the neck was supple [No LAD] : no lymphadenopathy [No LE Varicosities] : there were no varicosital changes in the lower extremities [Breast Exam Declined] : patient declined to have breast exam done [Non Tender] : non-tender [Patient Refused] : rectal exam was refused by the patient [No Joint Swelling] : no joint swelling seen [No Clubbing, Cyanosis] : no clubbing  or cyanosis of the fingernails [No Gross Sensory Deficits] : no gross sensory deficits [No Acute Distress] : no acute distress [Normal Sclera/Conjunctiva] : normal sclera/conjunctiva [EOMI] : extra ocular movement intact [Normal Outer Ear/Nose] : the ears and nose were normal in appearance [Normal Oropharynx] : the oropharynx was normal [No Respiratory Distress] : no respiratory distress [Clear to Auscultation] : lungs were clear to auscultation bilaterally [No Accessory Muscle Use] : no accessory muscle use [Normal Rate] : heart rate was normal  [Regular Rhythm] : with a regular rhythm [Normal S1, S2] : normal S1 and S2 [No Murmurs] : no murmurs heard [No Edema] : there was no peripheral edema [Normal Bowel Sounds] : normal bowel sounds [Soft] : abdomen soft [Not Distended] : not distended [Normal Post Cervical Nodes] : no posterior cervical lymphadenopathy [Normal Anterior Cervical Nodes] : no anterior cervical lymphadenopathy [No CVA Tenderness] : no ~M costovertebral angle tenderness [No Spinal Tenderness] : no spinal tenderness [Normal Strength/Tone] : muscle strength and tone were normal [No Rash] : no rash [Normal Affect] : the affect was normal [de-identified] : healed laceration at back of head [de-identified] : healed laceration on back of head s/p fall [de-identified] : lower abd distention [de-identified] : unsteady gait [de-identified] : confused, forgetful.

## 2024-10-02 NOTE — HISTORY OF PRESENT ILLNESS
[Patient is stable] : patient is stable [Patient] : patient [Family Member] : family member [FreeTextEntry1] : [employer] patient, hx of dementia, unsteady gait [FreeTextEntry2] : PMH: Dementia, MDD, HTN, HLD, T2DM, hypothyroidism, LBBB, h/o dizziness/vertigo.  -Interviewed with daughter Siomara, who primarily speaks for pt.  -Pt noncompliant with medications     Daughter states pt complains of burning sensation in the abd and refuses to eat sometimes. As per daughter pt has hx of 4 hernia in the past in [place] with surgical repair where mesh was placed but had to be removed later. Lower abd distention noted on physical examination, abd soft, pt denies pain at this time - Abdominal US was done 10/27/22 for similar complains which showed: no definitive evidence of right hernia. No cholelithiasis or acute cholecystitis. No hydronephrosis. 1.8 cm cyst of the upper pole right kidney.  - Repeat abd US denied by the daughter as the US abd last year did not show anything.  Daughter states she is able to manage the symptoms with "home remedies" and will continue to monitor her mom and will call house calls if anything changes. Recommended to Follow up with GI  Skin: dry skin. Uses olive oil. Cut on head is still healing.  Pain: Stomach pains, side pain after fall.  Gait/falls: Has walker but does not remember how to use. Having falls, dec 3rd while walking, mechanical fall. dec 30th fell out of bed.  Appetite: eats well but forgets that she ate.  BM: ok Urine: ok, incontinent Sleep: wont go to sleep at night, hallucinations  Dementia with psychosis: -Chronic, progressive - hallucinations are worsening. Talking to all pictures in house. "its getting dark i have to get to my home" but uses tv to calm her down. Sometimes aggressive or violent. "where is the stick. I am going to beat you". Verbally aggressive.  -Pt redirectable but needs continuous supervision.  -Has fairly preserved long-term memory, but short term deficits noted by daughter. Eats then forgets she eats, or insists she's already eaten. Same with bathing. -waking in the night and walking around, does not want to shower. Daughter states it's getting harder to take care of her but she has to as she cannot send her to nursing home.  -Daughter willing to try quetiapine as patient is up all night and hallucinations are getting worse.  -Recommended to see neurologist again, but daughter states mother does not want to go out   DM: Chronic, controlled -A1C 4/10/2023 6.2%, will recheck A1C -Daughter states she is giving metformin ER 500mg every other day as she feels her "sugar may go hypoglycemic" and states she has been managing with diet control -encouraged daughter to check her FS daily and keep a log in order to monitor the trend -educated daughter to follow diabetic diet and report any hypoglycemic episodes  HLD: Atorvastatin Calcium 20mg Daily  Hypothyroidism: Levothyroxine Sodium 75mcg daily  HTN: Telmisartan 20mg daily Spironolactone 25mg Daily  DME: none  Social/Home Environment: Speaks primarily Malayalam, limited English. Strongly Presybeterian, Confucianist. Father was a  from 2000-year-old tradition based in [place]. As such, she is adamant against certain forms of medical intervention & seeking care.  -Daughter Siomara is , with master's in genetics, former schoolteacher. Now a preacher, also believes in blayne healing. -Family from [facility]. Pt has three other daughters, 2 in [place], 1 in [place], [place] (missionary) -[employer] insurance -Neurologist Dr. Luc Judge - podiatrist - Dr. Asif -PCP Dr. Alicia Worley

## 2024-10-02 NOTE — HISTORY OF PRESENT ILLNESS
[Patient is stable] : patient is stable [Patient] : patient [Family Member] : family member [FreeTextEntry1] : [facility] patient, hx of dementia, unsteady gait [FreeTextEntry2] : PMH: Dementia, MDD, HTN, HLD, T2DM, hypothyroidism, LBBB, h/o dizziness/vertigo.  -Interviewed with daughter Siomara, who primarily speaks for pt.  -Pt noncompliant with medications     Daughter states pt complains of burning sensation in the abd and refuses to eat sometimes. As per daughter pt has hx of 4 hernia in the past in [place] with surgical repair where mesh was placed but had to be removed later. Lower abd distention noted on physical examination, abd soft, pt denies pain at this time - Abdominal US was done 10/27/22 for similar complains which showed: no definitive evidence of right hernia. No cholelithiasis or acute cholecystitis. No hydronephrosis. 1.8 cm cyst of the upper pole right kidney.  - Repeat abd US denied by the daughter as the US abd last year did not show anything.  Daughter states she is able to manage the symptoms with "home remedies" and will continue to monitor her mom and will call house calls if anything changes. Recommended to Follow up with GI  Skin: dry skin. Uses olive oil. Cut on head is still healing.  Pain: Stomach pains, side pain after fall.  Gait/falls: Has walker but does not remember how to use. Having falls, dec 3rd while walking, mechanical fall. dec 30th fell out of bed.  Appetite: eats well but forgets that she ate.  BM: ok Urine: ok, incontinent Sleep: wont go to sleep at night, hallucinations  Dementia with psychosis: -Chronic, progressive - hallucinations are worsening. Talking to all pictures in house. "its getting dark i have to get to my home" but uses tv to calm her down. Sometimes aggressive or violent. "where is the stick. I am going to beat you". Verbally aggressive.  -Pt redirectable but needs continuous supervision.  -Has fairly preserved long-term memory, but short term deficits noted by daughter. Eats then forgets she eats, or insists she's already eaten. Same with bathing. -waking in the night and walking around, does not want to shower. Daughter states it's getting harder to take care of her but she has to as she cannot send her to nursing home.  -Daughter willing to try quetiapine as patient is up all night and hallucinations are getting worse.  -Recommended to see neurologist again, but daughter states mother does not want to go out   DM: Chronic, controlled -A1C 4/10/2023 6.2%, will recheck A1C -Daughter states she is giving metformin ER 500mg every other day as she feels her "sugar may go hypoglycemic" and states she has been managing with diet control -encouraged daughter to check her FS daily and keep a log in order to monitor the trend -educated daughter to follow diabetic diet and report any hypoglycemic episodes  HLD: Atorvastatin Calcium 20mg Daily  Hypothyroidism: Levothyroxine Sodium 75mcg daily  HTN: Telmisartan 20mg daily Spironolactone 25mg Daily  DME: none  Social/Home Environment: Speaks primarily Malayalam, limited English. Strongly Nondenominational, Taoism. Father was a  from 2000-year-old tradition based in [place]. As such, she is adamant against certain forms of medical intervention & seeking care.  -Daughter Siomara is , with master's in genetics, former schoolteacher. Now a preacher, also believes in blayne healing. -Family from [facility]. Pt has three other daughters, 2 in [place], 1 in [place], [place] (missionary) -[employer] insurance -Neurologist Dr. Luc Judge - podiatrist - Dr. Asif -PCP Dr. Alicia Worley

## 2024-10-02 NOTE — PHYSICAL EXAM
[Normal Voice/Communication] : normal voice communication [No JVD] : no jugular venous distention [Supple] : the neck was supple [No LAD] : no lymphadenopathy [No LE Varicosities] : there were no varicosital changes in the lower extremities [Breast Exam Declined] : patient declined to have breast exam done [Non Tender] : non-tender [Patient Refused] : rectal exam was refused by the patient [No Joint Swelling] : no joint swelling seen [No Clubbing, Cyanosis] : no clubbing  or cyanosis of the fingernails [No Gross Sensory Deficits] : no gross sensory deficits [No Acute Distress] : no acute distress [Normal Sclera/Conjunctiva] : normal sclera/conjunctiva [EOMI] : extra ocular movement intact [Normal Outer Ear/Nose] : the ears and nose were normal in appearance [Normal Oropharynx] : the oropharynx was normal [No Respiratory Distress] : no respiratory distress [Clear to Auscultation] : lungs were clear to auscultation bilaterally [No Accessory Muscle Use] : no accessory muscle use [Normal Rate] : heart rate was normal  [Regular Rhythm] : with a regular rhythm [Normal S1, S2] : normal S1 and S2 [No Murmurs] : no murmurs heard [No Edema] : there was no peripheral edema [Normal Bowel Sounds] : normal bowel sounds [Soft] : abdomen soft [Not Distended] : not distended [Normal Post Cervical Nodes] : no posterior cervical lymphadenopathy [Normal Anterior Cervical Nodes] : no anterior cervical lymphadenopathy [No CVA Tenderness] : no ~M costovertebral angle tenderness [No Spinal Tenderness] : no spinal tenderness [Normal Strength/Tone] : muscle strength and tone were normal [No Rash] : no rash [Normal Affect] : the affect was normal [de-identified] : healed laceration at back of head [de-identified] : healed laceration on back of head s/p fall [de-identified] : lower abd distention [de-identified] : unsteady gait [de-identified] : confused, forgetful.

## 2024-10-02 NOTE — REVIEW OF SYSTEMS
[Negative] : Heme/Lymph [FreeTextEntry7] : as noted in HPI [de-identified] : as noted in HPI [de-identified] : as noted in HPI

## 2024-10-16 ENCOUNTER — RX RENEWAL (OUTPATIENT)
Age: 79
End: 2024-10-16

## 2024-11-19 ENCOUNTER — NON-APPOINTMENT (OUTPATIENT)
Age: 79
End: 2024-11-19

## 2024-12-16 ENCOUNTER — APPOINTMENT (OUTPATIENT)
Dept: HOME HEALTH SERVICES | Facility: HOME HEALTH | Age: 79
End: 2024-12-16
Payer: MEDICARE

## 2024-12-16 VITALS
OXYGEN SATURATION: 98 % | TEMPERATURE: 97.4 F | HEART RATE: 68 BPM | SYSTOLIC BLOOD PRESSURE: 120 MMHG | DIASTOLIC BLOOD PRESSURE: 60 MMHG | RESPIRATION RATE: 16 BRPM

## 2024-12-16 DIAGNOSIS — F03.90 UNSPECIFIED DEMENTIA W/OUT BEHAVIORAL DISTURBANCE: ICD-10-CM

## 2024-12-16 DIAGNOSIS — I10 ESSENTIAL (PRIMARY) HYPERTENSION: ICD-10-CM

## 2024-12-16 PROCEDURE — 99349 HOME/RES VST EST MOD MDM 40: CPT

## 2024-12-17 RX ORDER — BLOOD-GLUCOSE METER
W/DEVICE KIT MISCELLANEOUS
Qty: 1 | Refills: 0 | Status: ACTIVE | COMMUNITY
Start: 2024-12-17 | End: 1900-01-01

## 2025-02-19 PROBLEM — N39.498 TOTAL INCONTINENCE: Status: ACTIVE | Noted: 2025-02-19

## 2025-02-28 ENCOUNTER — TRANSCRIPTION ENCOUNTER (OUTPATIENT)
Age: 80
End: 2025-02-28

## 2025-02-28 ENCOUNTER — NON-APPOINTMENT (OUTPATIENT)
Age: 80
End: 2025-02-28

## 2025-03-26 ENCOUNTER — APPOINTMENT (OUTPATIENT)
Dept: HOME HEALTH SERVICES | Facility: HOME HEALTH | Age: 80
End: 2025-03-26

## 2025-03-27 ENCOUNTER — NON-APPOINTMENT (OUTPATIENT)
Age: 80
End: 2025-03-27

## 2025-04-01 ENCOUNTER — APPOINTMENT (OUTPATIENT)
Dept: HOME HEALTH SERVICES | Facility: HOME HEALTH | Age: 80
End: 2025-04-01
Payer: MEDICARE

## 2025-04-01 VITALS — RESPIRATION RATE: 16 BRPM

## 2025-04-01 DIAGNOSIS — R63.4 ABNORMAL WEIGHT LOSS: ICD-10-CM

## 2025-04-01 DIAGNOSIS — E78.5 HYPERLIPIDEMIA, UNSPECIFIED: ICD-10-CM

## 2025-04-01 DIAGNOSIS — F03.918 UNSPECIFIED DEMENTIA, UNSPECIFIED SEVERITY, WITH OTHER BEHAVIORAL DISTURBANCE: ICD-10-CM

## 2025-04-01 DIAGNOSIS — B35.1 TINEA UNGUIUM: ICD-10-CM

## 2025-04-01 DIAGNOSIS — E11.9 TYPE 2 DIABETES MELLITUS W/OUT COMPLICATIONS: ICD-10-CM

## 2025-04-01 DIAGNOSIS — G47.00 INSOMNIA, UNSPECIFIED: ICD-10-CM

## 2025-04-01 DIAGNOSIS — Z63.6 DEPENDENT RELATIVE NEEDING CARE AT HOME: ICD-10-CM

## 2025-04-01 DIAGNOSIS — F03.90 UNSPECIFIED DEMENTIA W/OUT BEHAVIORAL DISTURBANCE: ICD-10-CM

## 2025-04-01 DIAGNOSIS — I10 ESSENTIAL (PRIMARY) HYPERTENSION: ICD-10-CM

## 2025-04-01 DIAGNOSIS — I44.7 LEFT BUNDLE-BRANCH BLOCK, UNSPECIFIED: ICD-10-CM

## 2025-04-01 DIAGNOSIS — Z91.83 UNSPECIFIED DEMENTIA, UNSPECIFIED SEVERITY, WITH OTHER BEHAVIORAL DISTURBANCE: ICD-10-CM

## 2025-04-01 DIAGNOSIS — F29 UNSPECIFIED PSYCHOSIS NOT DUE TO A SUBSTANCE OR KNOWN PHYSIOLOGICAL CONDITION: ICD-10-CM

## 2025-04-01 PROCEDURE — 99349 HOME/RES VST EST MOD MDM 40: CPT | Mod: 2W

## 2025-04-01 SDOH — SOCIAL STABILITY - SOCIAL INSECURITY: DEPENDENT RELATIVE NEEDING CARE AT HOME: Z63.6

## 2025-04-02 ENCOUNTER — LABORATORY RESULT (OUTPATIENT)
Age: 80
End: 2025-04-02

## 2025-04-04 DIAGNOSIS — E03.9 HYPOTHYROIDISM, UNSPECIFIED: ICD-10-CM

## 2025-04-07 ENCOUNTER — RX RENEWAL (OUTPATIENT)
Age: 80
End: 2025-04-07

## 2025-04-07 ENCOUNTER — APPOINTMENT (OUTPATIENT)
Dept: HOME HEALTH SERVICES | Facility: HOME HEALTH | Age: 80
End: 2025-04-07

## 2025-04-07 VITALS
RESPIRATION RATE: 18 BRPM | OXYGEN SATURATION: 95 % | DIASTOLIC BLOOD PRESSURE: 70 MMHG | SYSTOLIC BLOOD PRESSURE: 140 MMHG | HEART RATE: 71 BPM

## 2025-04-07 RX ORDER — TRAZODONE HYDROCHLORIDE ORAL 10 MG/ML
10 SOLUTION ORAL
Qty: 1 | Refills: 1 | Status: ACTIVE | COMMUNITY
Start: 2025-04-01

## 2025-04-08 ENCOUNTER — LABORATORY RESULT (OUTPATIENT)
Age: 80
End: 2025-04-08

## 2025-05-12 ENCOUNTER — APPOINTMENT (OUTPATIENT)
Dept: GERIATRICS | Facility: CLINIC | Age: 80
End: 2025-05-12

## 2025-05-19 ENCOUNTER — LABORATORY RESULT (OUTPATIENT)
Age: 80
End: 2025-05-19

## 2025-05-20 ENCOUNTER — LABORATORY RESULT (OUTPATIENT)
Age: 80
End: 2025-05-20

## 2025-06-17 ENCOUNTER — APPOINTMENT (OUTPATIENT)
Dept: GERIATRICS | Facility: CLINIC | Age: 80
End: 2025-06-17
Payer: MEDICARE

## 2025-06-17 PROBLEM — Z83.3 FAMILY HISTORY OF TYPE 2 DIABETES MELLITUS: Status: ACTIVE | Noted: 2025-06-17

## 2025-06-17 PROCEDURE — 99215 OFFICE O/P EST HI 40 MIN: CPT | Mod: 2W

## 2025-06-25 ENCOUNTER — NON-APPOINTMENT (OUTPATIENT)
Age: 80
End: 2025-06-25

## 2025-06-25 PROBLEM — F03.918 DEMENTIA WITH BEHAVIORAL DISTURBANCE: Status: ACTIVE | Noted: 2025-06-25

## 2025-06-25 PROBLEM — R44.1 HALLUCINATIONS, VISUAL: Status: ACTIVE | Noted: 2025-06-25

## 2025-06-25 PROBLEM — F03.918 AGGRESSIVE BEHAVIOR DUE TO DEMENTIA: Status: ACTIVE | Noted: 2025-06-25

## 2025-06-25 PROBLEM — F41.9 ANXIETY: Status: ACTIVE | Noted: 2025-06-25

## 2025-07-01 ENCOUNTER — TRANSCRIPTION ENCOUNTER (OUTPATIENT)
Age: 80
End: 2025-07-01

## 2025-07-01 ENCOUNTER — APPOINTMENT (OUTPATIENT)
Dept: AFTER HOURS CARE | Facility: EMERGENCY ROOM | Age: 80
End: 2025-07-01

## 2025-07-01 PROBLEM — S09.90XA CLOSED HEAD INJURY, INITIAL ENCOUNTER: Status: ACTIVE | Noted: 2025-07-01

## 2025-07-01 PROCEDURE — 99205 OFFICE O/P NEW HI 60 MIN: CPT | Mod: 2W

## 2025-07-07 ENCOUNTER — NON-APPOINTMENT (OUTPATIENT)
Age: 80
End: 2025-07-07

## 2025-07-07 LAB
ALBUMIN SERPL ELPH-MCNC: 3.5 G/DL
ALP BLD-CCNC: 65 U/L
ALT SERPL-CCNC: 42 U/L
ANION GAP SERPL CALC-SCNC: 12 MMOL/L
APPEARANCE: CLEAR
AST SERPL-CCNC: 32 U/L
BACTERIA UR CULT: NORMAL
BACTERIA: NEGATIVE /HPF
BASOPHILS # BLD AUTO: 0.04 K/UL
BASOPHILS NFR BLD AUTO: 0.8 %
BILIRUB SERPL-MCNC: 0.2 MG/DL
BILIRUBIN URINE: NEGATIVE
BLOOD URINE: NEGATIVE
BUN SERPL-MCNC: 15 MG/DL
CALCIUM SERPL-MCNC: 8.8 MG/DL
CAST: 0 /LPF
CHLORIDE SERPL-SCNC: 108 MMOL/L
CO2 SERPL-SCNC: 24 MMOL/L
COLOR: YELLOW
CREAT SERPL-MCNC: 0.77 MG/DL
EGFRCR SERPLBLD CKD-EPI 2021: 79 ML/MIN/1.73M2
EOSINOPHIL # BLD AUTO: 0.04 K/UL
EOSINOPHIL NFR BLD AUTO: 0.8 %
EPITHELIAL CELLS: 0 /HPF
GLUCOSE QUALITATIVE U: NEGATIVE MG/DL
GLUCOSE SERPL-MCNC: 78 MG/DL
HCT VFR BLD CALC: 30.8 %
HGB BLD-MCNC: 10.1 G/DL
IMM GRANULOCYTES NFR BLD AUTO: 0.2 %
KETONES URINE: NEGATIVE MG/DL
LEUKOCYTE ESTERASE URINE: NEGATIVE
LYMPHOCYTES # BLD AUTO: 2.05 K/UL
LYMPHOCYTES NFR BLD AUTO: 39.5 %
MAN DIFF?: NORMAL
MCHC RBC-ENTMCNC: 32.4 PG
MCHC RBC-ENTMCNC: 32.8 G/DL
MCV RBC AUTO: 98.7 FL
MICROSCOPIC-UA: NORMAL
MONOCYTES # BLD AUTO: 0.43 K/UL
MONOCYTES NFR BLD AUTO: 8.3 %
NEUTROPHILS # BLD AUTO: 2.62 K/UL
NEUTROPHILS NFR BLD AUTO: 50.4 %
NITRITE URINE: NEGATIVE
PH URINE: 6.5
PLATELET # BLD AUTO: 201 K/UL
POTASSIUM SERPL-SCNC: 4.5 MMOL/L
PROT SERPL-MCNC: 5.1 G/DL
PROTEIN URINE: NEGATIVE MG/DL
RBC # BLD: 3.12 M/UL
RBC # FLD: 14.1 %
RED BLOOD CELLS URINE: 5 /HPF
SODIUM SERPL-SCNC: 144 MMOL/L
SPECIFIC GRAVITY URINE: 1.01
UROBILINOGEN URINE: 0.2 MG/DL
WBC # FLD AUTO: 5.19 K/UL
WHITE BLOOD CELLS URINE: 0 /HPF

## 2025-07-07 RX ORDER — OLANZAPINE 2.5 MG/1
2.5 TABLET, FILM COATED ORAL
Qty: 30 | Refills: 3 | Status: ACTIVE | COMMUNITY
Start: 2025-07-07 | End: 1900-01-01

## 2025-08-22 ENCOUNTER — APPOINTMENT (OUTPATIENT)
Dept: HOME HEALTH SERVICES | Facility: HOME HEALTH | Age: 80
End: 2025-08-22

## 2025-08-22 ENCOUNTER — NON-APPOINTMENT (OUTPATIENT)
Age: 80
End: 2025-08-22

## 2025-08-22 DIAGNOSIS — E03.9 HYPOTHYROIDISM, UNSPECIFIED: ICD-10-CM

## 2025-08-22 DIAGNOSIS — E11.9 TYPE 2 DIABETES MELLITUS W/OUT COMPLICATIONS: ICD-10-CM

## 2025-08-22 DIAGNOSIS — F03.918 UNSPECIFIED DEMENTIA, UNSPECIFIED SEVERITY, WITH OTHER BEHAVIORAL DISTURBANCE: ICD-10-CM

## 2025-08-22 DIAGNOSIS — F29 UNSPECIFIED PSYCHOSIS NOT DUE TO A SUBSTANCE OR KNOWN PHYSIOLOGICAL CONDITION: ICD-10-CM

## 2025-09-18 ENCOUNTER — NON-APPOINTMENT (OUTPATIENT)
Age: 80
End: 2025-09-18

## 2025-09-26 PROBLEM — Z23 NEED FOR INFLUENZA VACCINATION: Status: ACTIVE | Noted: 2025-09-26
